# Patient Record
Sex: MALE | Race: BLACK OR AFRICAN AMERICAN | NOT HISPANIC OR LATINO | Employment: FULL TIME | RURAL
[De-identification: names, ages, dates, MRNs, and addresses within clinical notes are randomized per-mention and may not be internally consistent; named-entity substitution may affect disease eponyms.]

---

## 2022-01-10 ENCOUNTER — OFFICE VISIT (OUTPATIENT)
Dept: FAMILY MEDICINE | Facility: CLINIC | Age: 57
End: 2022-01-10
Payer: COMMERCIAL

## 2022-01-10 VITALS
TEMPERATURE: 101 F | BODY MASS INDEX: 32.73 KG/M2 | HEART RATE: 107 BPM | OXYGEN SATURATION: 99 % | HEIGHT: 70 IN | SYSTOLIC BLOOD PRESSURE: 138 MMHG | DIASTOLIC BLOOD PRESSURE: 85 MMHG | WEIGHT: 228.63 LBS

## 2022-01-10 DIAGNOSIS — U07.1 COVID: ICD-10-CM

## 2022-01-10 DIAGNOSIS — R05.9 COUGH: ICD-10-CM

## 2022-01-10 DIAGNOSIS — R68.83 CHILL: Primary | ICD-10-CM

## 2022-01-10 LAB
CTP QC/QA: YES
FLUAV AG NPH QL: NEGATIVE
FLUBV AG NPH QL: NEGATIVE
SARS-COV-2 AG RESP QL IA.RAPID: POSITIVE

## 2022-01-10 PROCEDURE — 87428 SARSCOV & INF VIR A&B AG IA: CPT | Mod: QW,,, | Performed by: FAMILY MEDICINE

## 2022-01-10 PROCEDURE — 87428 POCT SARS-COV2 (COVID) WITH FLU ANTIGEN: ICD-10-PCS | Mod: QW,,, | Performed by: FAMILY MEDICINE

## 2022-01-10 PROCEDURE — 99213 PR OFFICE/OUTPT VISIT, EST, LEVL III, 20-29 MIN: ICD-10-PCS | Mod: ,,, | Performed by: FAMILY MEDICINE

## 2022-01-10 PROCEDURE — 99213 OFFICE O/P EST LOW 20 MIN: CPT | Mod: ,,, | Performed by: FAMILY MEDICINE

## 2022-01-10 PROCEDURE — 3075F PR MOST RECENT SYSTOLIC BLOOD PRESS GE 130-139MM HG: ICD-10-PCS | Mod: CPTII,,, | Performed by: FAMILY MEDICINE

## 2022-01-10 PROCEDURE — 3079F PR MOST RECENT DIASTOLIC BLOOD PRESSURE 80-89 MM HG: ICD-10-PCS | Mod: CPTII,,, | Performed by: FAMILY MEDICINE

## 2022-01-10 PROCEDURE — 1159F PR MEDICATION LIST DOCUMENTED IN MEDICAL RECORD: ICD-10-PCS | Mod: CPTII,,, | Performed by: FAMILY MEDICINE

## 2022-01-10 PROCEDURE — 3075F SYST BP GE 130 - 139MM HG: CPT | Mod: CPTII,,, | Performed by: FAMILY MEDICINE

## 2022-01-10 PROCEDURE — 1159F MED LIST DOCD IN RCRD: CPT | Mod: CPTII,,, | Performed by: FAMILY MEDICINE

## 2022-01-10 PROCEDURE — 3008F PR BODY MASS INDEX (BMI) DOCUMENTED: ICD-10-PCS | Mod: CPTII,,, | Performed by: FAMILY MEDICINE

## 2022-01-10 PROCEDURE — 3008F BODY MASS INDEX DOCD: CPT | Mod: CPTII,,, | Performed by: FAMILY MEDICINE

## 2022-01-10 PROCEDURE — 3079F DIAST BP 80-89 MM HG: CPT | Mod: CPTII,,, | Performed by: FAMILY MEDICINE

## 2022-01-10 RX ORDER — ALBUTEROL SULFATE 90 UG/1
2 AEROSOL, METERED RESPIRATORY (INHALATION) EVERY 6 HOURS PRN
COMMUNITY
End: 2022-03-03 | Stop reason: SDUPTHER

## 2022-01-10 RX ORDER — CARVEDILOL 12.5 MG/1
1 TABLET ORAL 2 TIMES DAILY
COMMUNITY
Start: 2021-11-04 | End: 2022-02-04

## 2022-01-10 RX ORDER — CODEINE PHOSPHATE AND GUAIFENESIN 10; 100 MG/5ML; MG/5ML
5 SOLUTION ORAL 3 TIMES DAILY PRN
Qty: 240 ML | Refills: 0 | Status: SHIPPED | OUTPATIENT
Start: 2022-01-10 | End: 2022-01-26

## 2022-01-10 RX ORDER — SPIRONOLACTONE 25 MG/1
1 TABLET ORAL DAILY
COMMUNITY
Start: 2021-11-04 | End: 2022-02-04

## 2022-01-10 NOTE — LETTER
January 10, 2022      [facility]  [address]  [facility] [address]  Phone: [phone]  Fax: [phone]       Patient: Catalina Stephens   YOB: 1965  Date of Visit: 01/10/2022    To Whom It May Concern:    Donaldo Stephens  was at [facility] on 01/10/2022. He tested positive for Covid.  According to CDC guidelines, The patient may return to work on Saturday 01/15/22 with no restrictions. If you have any questions or concerns, or if I can be of further assistance, please do not hesitate to contact me.    Sincerely,    Ja Hernandez MD

## 2022-01-10 NOTE — PROGRESS NOTES
Ja Hernandez MD   [facility]  [address] [place] [address]     PATIENT NAME: Catalina Stephens  : 1965  DATE: 1/10/22  MRN: [id number]      Billing Provider: Ja Hernandez MD  Level of Service: [place] OFFICE/OUTPT VISIT, EST, LEVL III, 20-29 MIN  Patient PCP Information     Provider PCP Type    Ja Hernandez MD General          Reason for Visit / Chief Complaint: Cough (Dry cough that started yesterday. Feeling fatigued. Having chills. ) and Sinusitis (PN drainage, scratchy throat, and dry cough with chills off and on that started yesterday. )         History of Present Illness / Problem Focused Workflow     Catalina Stephens presents to the clinic with Cough (Dry cough that started yesterday. Feeling fatigued. Having chills. ) and Sinusitis (PN drainage, scratchy throat, and dry cough with chills off and on that started yesterday. )     HPI    Review of Systems     Review of Systems   Constitutional: Positive for chills and fever. Negative for activity change, appetite change and fatigue.   HENT: Positive for nasal congestion, rhinorrhea, sinus pressure/congestion and sore throat. Negative for ear pain and hearing loss.    Respiratory: Positive for cough. Negative for chest tightness and shortness of breath.    Cardiovascular: Negative for chest pain and palpitations.   Gastrointestinal: Negative for abdominal pain and fecal incontinence.   Genitourinary: Negative for bladder incontinence, difficulty urinating and erectile dysfunction.   Musculoskeletal: Negative for arthralgias.   Integumentary:  Negative for rash.   Neurological: Negative for dizziness and headaches.        Medical / Social / Family History     Past Medical History:   Diagnosis Date    Disorder of kidney and ureter     Hypertension        History reviewed. No pertinent surgical history.    Social History  Catalina Stephens  reports that he has never smoked. He has never used smokeless tobacco. He  reports previous alcohol use. He reports that he does not use drugs.    Family History  Catalina Stephens  family history includes Heart attack in his father; Kidney disease in his father; Stroke in his maternal grandfather.    Medications and Allergies     Medications  Outpatient Medications Marked as Taking for the 1/10/22 encounter (Office Visit) with Ja Hernandez MD   Medication Sig Dispense Refill    albuterol (PROAIR HFA) 90 mcg/actuation inhaler Inhale 2 puffs into the lungs every 6 (six) hours as needed for Wheezing. Rescue      carvediloL (COREG) 12.5 MG tablet Take 1 tablet by mouth 2 (two) times a day.      spironolactone (ALDACTONE) 25 MG tablet Take 1 tablet by mouth once daily.         Allergies  Review of patient's allergies indicates:   Allergen Reactions    Ace inhibitors Swelling    Iodine and iodide containing products Nausea And Vomiting     SHRIMP       Physical Examination     Vitals:    01/10/22 1558   BP: 138/85   Pulse: 107   Temp: (!) 101.4 °F (38.6 °C)     Physical Exam  Constitutional:       General: He is not in acute distress.     Appearance: He is not ill-appearing.   HENT:      Head: Normocephalic and atraumatic.      Right Ear: Tympanic membrane and ear canal normal.      Left Ear: Tympanic membrane and ear canal normal.      Nose: Congestion and rhinorrhea present.   Eyes:      Pupils: Pupils are equal, round, and reactive to light.   Cardiovascular:      Rate and Rhythm: Normal rate and regular rhythm.      Pulses: Normal pulses.      Heart sounds: No murmur heard.      Pulmonary:      Effort: No respiratory distress.      Breath sounds: No wheezing, rhonchi or rales.   Abdominal:      General: Bowel sounds are normal.      Palpations: Abdomen is soft.      Tenderness: There is no abdominal tenderness.      Hernia: No hernia is present.   Musculoskeletal:      Cervical back: Normal range of motion and neck supple.   Lymphadenopathy:      Cervical: No cervical adenopathy.    Skin:     General: Skin is warm and dry.   Neurological:      Mental Status: He is alert.   Psychiatric:         Behavior: Behavior normal.         Thought Content: Thought content normal.          Assessment and Plan (including Health Maintenance)   :    Plan:         Health Maintenance Due   Topic Date Due    Hepatitis C Screening  Never done    COVID-19 Vaccine (1) Never done    HIV Screening  Never done    TETANUS VACCINE  Never done    Colorectal Cancer Screening  Never done    Shingles Vaccine (1 of 2) Never done    Influenza Vaccine (1) Never done       Problem List Items Addressed This Visit    None     Visit Diagnoses     Chill    -  Primary    Relevant Orders    POCT SARS-COV2 (COVID) with Flu Antigen (Completed)    Cough        Relevant Orders    POCT SARS-COV2 (COVID) with Flu Antigen (Completed)    COVID            Chill  -     POCT SARS-COV2 (COVID) with Flu Antigen    Cough  -     POCT SARS-COV2 (COVID) with Flu Antigen    COVID    Other orders  -     guaiFENesin-codeine 100-10 mg/5 ml (TUSSI-ORGANIDIN NR)  mg/5 mL syrup; Take 5 mLs by mouth 3 (three) times daily as needed for Cough.  Dispense: 240 mL; Refill: 0       Health Maintenance Topics with due status: Not Due       Topic Last Completion Date    Lipid Panel 05/06/2021       Procedures     No future appointments.     No follow-ups on file.       Signature:  Ja Hernandez MD  [facility]  [address] [place] [address]  [phone] Phone  [phone] Fax    Date of encounter: 1/10/22

## 2022-03-03 ENCOUNTER — OFFICE VISIT (OUTPATIENT)
Dept: FAMILY MEDICINE | Facility: CLINIC | Age: 57
End: 2022-03-03
Payer: COMMERCIAL

## 2022-03-03 VITALS
DIASTOLIC BLOOD PRESSURE: 85 MMHG | TEMPERATURE: 98 F | BODY MASS INDEX: 32.78 KG/M2 | OXYGEN SATURATION: 98 % | HEIGHT: 70 IN | WEIGHT: 229 LBS | HEART RATE: 88 BPM | SYSTOLIC BLOOD PRESSURE: 122 MMHG

## 2022-03-03 DIAGNOSIS — I10 HYPERTENSION, UNSPECIFIED TYPE: Primary | ICD-10-CM

## 2022-03-03 DIAGNOSIS — J45.909 ASTHMA, UNSPECIFIED ASTHMA SEVERITY, UNSPECIFIED WHETHER COMPLICATED, UNSPECIFIED WHETHER PERSISTENT: ICD-10-CM

## 2022-03-03 LAB
ALBUMIN SERPL BCP-MCNC: 3.4 G/DL (ref 3.5–5)
ALBUMIN/GLOB SERPL: 0.9 {RATIO}
ALP SERPL-CCNC: 67 U/L (ref 45–115)
ALT SERPL W P-5'-P-CCNC: 12 U/L (ref 16–61)
ANION GAP SERPL CALCULATED.3IONS-SCNC: 13 MMOL/L (ref 7–16)
AST SERPL W P-5'-P-CCNC: 9 U/L (ref 15–37)
BASOPHILS # BLD AUTO: 0.02 K/UL (ref 0–0.2)
BASOPHILS NFR BLD AUTO: 0.4 % (ref 0–1)
BILIRUB SERPL-MCNC: 0.3 MG/DL (ref 0–1.2)
BUN SERPL-MCNC: 62 MG/DL (ref 7–18)
BUN/CREAT SERPL: 12 (ref 6–20)
CALCIUM SERPL-MCNC: 7.7 MG/DL (ref 8.5–10.1)
CHLORIDE SERPL-SCNC: 110 MMOL/L (ref 98–107)
CHOLEST SERPL-MCNC: 152 MG/DL (ref 0–200)
CHOLEST/HDLC SERPL: 4.5 {RATIO}
CO2 SERPL-SCNC: 20 MMOL/L (ref 21–32)
CREAT SERPL-MCNC: 5.32 MG/DL (ref 0.7–1.3)
DIFFERENTIAL METHOD BLD: ABNORMAL
EOSINOPHIL # BLD AUTO: 0.13 K/UL (ref 0–0.5)
EOSINOPHIL NFR BLD AUTO: 2.4 % (ref 1–4)
ERYTHROCYTE [DISTWIDTH] IN BLOOD BY AUTOMATED COUNT: 13.8 % (ref 11.5–14.5)
GLOBULIN SER-MCNC: 3.6 G/DL (ref 2–4)
GLUCOSE SERPL-MCNC: 88 MG/DL (ref 74–106)
HCT VFR BLD AUTO: 34.5 % (ref 40–54)
HDLC SERPL-MCNC: 34 MG/DL (ref 40–60)
HGB BLD-MCNC: 10.4 G/DL (ref 13.5–18)
IMM GRANULOCYTES # BLD AUTO: 0 K/UL (ref 0–0.04)
IMM GRANULOCYTES NFR BLD: 0 % (ref 0–0.4)
LDLC SERPL CALC-MCNC: 104 MG/DL
LDLC/HDLC SERPL: 3.1 {RATIO}
LYMPHOCYTES # BLD AUTO: 0.7 K/UL (ref 1–4.8)
LYMPHOCYTES NFR BLD AUTO: 13.1 % (ref 27–41)
MCH RBC QN AUTO: 28.3 PG (ref 27–31)
MCHC RBC AUTO-ENTMCNC: 30.1 G/DL (ref 32–36)
MCV RBC AUTO: 93.8 FL (ref 80–96)
MONOCYTES # BLD AUTO: 0.46 K/UL (ref 0–0.8)
MONOCYTES NFR BLD AUTO: 8.6 % (ref 2–6)
MPC BLD CALC-MCNC: 12.1 FL (ref 9.4–12.4)
NEUTROPHILS # BLD AUTO: 4.03 K/UL (ref 1.8–7.7)
NEUTROPHILS NFR BLD AUTO: 75.5 % (ref 53–65)
NONHDLC SERPL-MCNC: 118 MG/DL
NRBC # BLD AUTO: 0 X10E3/UL
NRBC, AUTO (.00): 0 %
PLATELET # BLD AUTO: 214 K/UL (ref 150–400)
POTASSIUM SERPL-SCNC: 5.5 MMOL/L (ref 3.5–5.1)
PROT SERPL-MCNC: 7 G/DL (ref 6.4–8.2)
RBC # BLD AUTO: 3.68 M/UL (ref 4.6–6.2)
SODIUM SERPL-SCNC: 137 MMOL/L (ref 136–145)
TRIGL SERPL-MCNC: 71 MG/DL (ref 35–150)
VLDLC SERPL-MCNC: 14 MG/DL
WBC # BLD AUTO: 5.34 K/UL (ref 4.5–11)

## 2022-03-03 PROCEDURE — 80061 LIPID PANEL: CPT | Mod: ,,, | Performed by: CLINICAL MEDICAL LABORATORY

## 2022-03-03 PROCEDURE — 80053 COMPREHEN METABOLIC PANEL: CPT | Mod: ,,, | Performed by: CLINICAL MEDICAL LABORATORY

## 2022-03-03 PROCEDURE — 3074F SYST BP LT 130 MM HG: CPT | Mod: CPTII,,, | Performed by: FAMILY MEDICINE

## 2022-03-03 PROCEDURE — 3074F PR MOST RECENT SYSTOLIC BLOOD PRESSURE < 130 MM HG: ICD-10-PCS | Mod: CPTII,,, | Performed by: FAMILY MEDICINE

## 2022-03-03 PROCEDURE — 80053 COMPREHENSIVE METABOLIC PANEL: ICD-10-PCS | Mod: ,,, | Performed by: CLINICAL MEDICAL LABORATORY

## 2022-03-03 PROCEDURE — 1159F MED LIST DOCD IN RCRD: CPT | Mod: CPTII,,, | Performed by: FAMILY MEDICINE

## 2022-03-03 PROCEDURE — 85025 COMPLETE CBC W/AUTO DIFF WBC: CPT | Mod: ,,, | Performed by: CLINICAL MEDICAL LABORATORY

## 2022-03-03 PROCEDURE — 3079F PR MOST RECENT DIASTOLIC BLOOD PRESSURE 80-89 MM HG: ICD-10-PCS | Mod: CPTII,,, | Performed by: FAMILY MEDICINE

## 2022-03-03 PROCEDURE — 99214 PR OFFICE/OUTPT VISIT, EST, LEVL IV, 30-39 MIN: ICD-10-PCS | Mod: ,,, | Performed by: FAMILY MEDICINE

## 2022-03-03 PROCEDURE — 3008F PR BODY MASS INDEX (BMI) DOCUMENTED: ICD-10-PCS | Mod: CPTII,,, | Performed by: FAMILY MEDICINE

## 2022-03-03 PROCEDURE — 3008F BODY MASS INDEX DOCD: CPT | Mod: CPTII,,, | Performed by: FAMILY MEDICINE

## 2022-03-03 PROCEDURE — 99214 OFFICE O/P EST MOD 30 MIN: CPT | Mod: ,,, | Performed by: FAMILY MEDICINE

## 2022-03-03 PROCEDURE — 85025 CBC WITH DIFFERENTIAL: ICD-10-PCS | Mod: ,,, | Performed by: CLINICAL MEDICAL LABORATORY

## 2022-03-03 PROCEDURE — 80061 LIPID PANEL: ICD-10-PCS | Mod: ,,, | Performed by: CLINICAL MEDICAL LABORATORY

## 2022-03-03 PROCEDURE — 3079F DIAST BP 80-89 MM HG: CPT | Mod: CPTII,,, | Performed by: FAMILY MEDICINE

## 2022-03-03 PROCEDURE — 1159F PR MEDICATION LIST DOCUMENTED IN MEDICAL RECORD: ICD-10-PCS | Mod: CPTII,,, | Performed by: FAMILY MEDICINE

## 2022-03-03 RX ORDER — SPIRONOLACTONE 25 MG/1
25 TABLET ORAL DAILY
Qty: 90 TABLET | Refills: 1 | Status: ON HOLD | OUTPATIENT
Start: 2022-03-03 | End: 2022-05-02 | Stop reason: HOSPADM

## 2022-03-03 RX ORDER — ALBUTEROL SULFATE 90 UG/1
2 AEROSOL, METERED RESPIRATORY (INHALATION) EVERY 6 HOURS PRN
Qty: 18 G | Refills: 2 | Status: SHIPPED | OUTPATIENT
Start: 2022-03-03 | End: 2023-08-17

## 2022-03-03 RX ORDER — MONTELUKAST SODIUM 10 MG/1
10 TABLET ORAL NIGHTLY
Qty: 90 TABLET | Refills: 3 | Status: SHIPPED | OUTPATIENT
Start: 2022-03-03 | End: 2022-04-02

## 2022-03-03 RX ORDER — CARVEDILOL 12.5 MG/1
12.5 TABLET ORAL 2 TIMES DAILY
Qty: 180 TABLET | Refills: 1 | Status: SHIPPED | OUTPATIENT
Start: 2022-03-03 | End: 2022-07-06

## 2022-03-03 NOTE — PROGRESS NOTES
Ja Hernandez MD   [facility]  [address] [place] [address]     PATIENT NAME: Catalina Stephens  : 1965  DATE: 3/3/22  MRN: [id number]      Billing Provider: Ja Hernandez MD  Level of Service: IN OFFICE/OUTPT VISIT, EST, LEVL IV, 30-39 MIN  Patient PCP Information     Provider PCP Type    Ja Hernandez MD General          Reason for Visit / Chief Complaint: Hypertension (Check up; Labs; Refills. ) and Medication Refill (Patient reports asthma has been increased in the past week. Increased shortness of breath at times. )         History of Present Illness / Problem Focused Workflow     Catalina Stephens presents to the clinic with Hypertension (Check up; Labs; Refills. ) and Medication Refill (Patient reports asthma has been increased in the past week. Increased shortness of breath at times. )     HPI    Review of Systems     Review of Systems   Constitutional: Negative for activity change, appetite change, fatigue and fever.   HENT: Negative for nasal congestion, ear pain, hearing loss, sinus pressure/congestion and sore throat.    Respiratory: Negative for cough, chest tightness and shortness of breath.    Cardiovascular: Negative for chest pain and palpitations.   Gastrointestinal: Negative for abdominal pain and fecal incontinence.   Genitourinary: Negative for bladder incontinence, difficulty urinating and erectile dysfunction.   Musculoskeletal: Negative for arthralgias.   Integumentary:  Negative for rash.   Neurological: Negative for dizziness and headaches.        Medical / Social / Family History     Past Medical History:   Diagnosis Date    Disorder of kidney and ureter     Hypertension     Mild intermittent asthma, uncomplicated        No past surgical history on file.    Social History  Catalina Stephens  reports that he has never smoked. He has never used smokeless tobacco. He reports previous alcohol use. He reports that he does not use  drugs.    Family History  Catalina Stephens  family history includes Heart attack in his father; Kidney disease in his father; Stroke in his maternal grandfather.    Medications and Allergies     Medications  Outpatient Medications Marked as Taking for the 3/3/22 encounter (Office Visit) with Ja Hernandez MD   Medication Sig Dispense Refill    [DISCONTINUED] albuterol (PROVENTIL/VENTOLIN HFA) 90 mcg/actuation inhaler Inhale 2 puffs into the lungs every 6 (six) hours as needed for Wheezing. Rescue      [DISCONTINUED] carvediloL (COREG) 12.5 MG tablet TAKE 1 TABLET BY MOUTH TWICE A DAY 60 tablet 0    [DISCONTINUED] spironolactone (ALDACTONE) 25 MG tablet TAKE 1 TABLET BY MOUTH EVERY DAY 30 tablet 0       Allergies  Review of patient's allergies indicates:   Allergen Reactions    Ace inhibitors Swelling    Iodine and iodide containing products Nausea And Vomiting     SHRIMP       Physical Examination     Vitals:    03/03/22 0736   BP: 122/85   Pulse: 88   Temp: 98 °F (36.7 °C)     Physical Exam  Constitutional:       General: He is not in acute distress.     Appearance: He is not ill-appearing.   HENT:      Head: Normocephalic and atraumatic.      Right Ear: Tympanic membrane and ear canal normal.      Left Ear: Tympanic membrane and ear canal normal.      Nose: Nose normal. No congestion or rhinorrhea.   Eyes:      Pupils: Pupils are equal, round, and reactive to light.   Cardiovascular:      Rate and Rhythm: Normal rate and regular rhythm.      Pulses: Normal pulses.      Heart sounds: No murmur heard.  Pulmonary:      Effort: No respiratory distress.      Breath sounds: No wheezing, rhonchi or rales.   Abdominal:      General: Bowel sounds are normal.      Palpations: Abdomen is soft.      Tenderness: There is no abdominal tenderness.      Hernia: No hernia is present.   Musculoskeletal:      Cervical back: Normal range of motion and neck supple.   Lymphadenopathy:      Cervical: No cervical adenopathy.    Skin:     General: Skin is warm and dry.   Neurological:      Mental Status: He is alert.   Psychiatric:         Behavior: Behavior normal.         Thought Content: Thought content normal.          Assessment and Plan (including Health Maintenance)   :    Plan:         Health Maintenance Due   Topic Date Due    Hepatitis C Screening  Never done    COVID-19 Vaccine (1) Never done    HIV Screening  Never done    TETANUS VACCINE  Never done    Colorectal Cancer Screening  Never done    Shingles Vaccine (1 of 2) Never done    Influenza Vaccine (1) Never done       Problem List Items Addressed This Visit    None     Visit Diagnoses     Hypertension, unspecified type    -  Primary    Relevant Orders    CBC Auto Differential    Comprehensive Metabolic Panel    Lipid Panel    Asthma, unspecified asthma severity, unspecified whether complicated, unspecified whether persistent            Hypertension, unspecified type  -     CBC Auto Differential; Future; Expected date: 03/03/2022  -     Comprehensive Metabolic Panel; Future; Expected date: 03/03/2022  -     Lipid Panel; Future; Expected date: 03/03/2022    Asthma, unspecified asthma severity, unspecified whether complicated, unspecified whether persistent    Other orders  -     spironolactone (ALDACTONE) 25 MG tablet; Take 1 tablet (25 mg total) by mouth once daily.  Dispense: 90 tablet; Refill: 1  -     carvediloL (COREG) 12.5 MG tablet; Take 1 tablet (12.5 mg total) by mouth 2 (two) times daily.  Dispense: 180 tablet; Refill: 1  -     albuterol (PROVENTIL/VENTOLIN HFA) 90 mcg/actuation inhaler; Inhale 2 puffs into the lungs every 6 (six) hours as needed for Wheezing. Rescue  Dispense: 18 g; Refill: 2  -     montelukast (SINGULAIR) 10 mg tablet; Take 1 tablet (10 mg total) by mouth every evening.  Dispense: 90 tablet; Refill: 3       Health Maintenance Topics with due status: Not Due       Topic Last Completion Date    Lipid Panel 05/06/2021       Procedures     No  "future appointments.     No follow-ups on file.  Discussed importance of colonoscopy. He sasys he will "get back" with us about it.    Signature:  Ja Hernandez MD  [facility]  [address] [place] [address]  [phone] Phone  [phone] Fax    Date of encounter: 3/3/22  "

## 2022-04-23 ENCOUNTER — HOSPITAL ENCOUNTER (EMERGENCY)
Facility: HOSPITAL | Age: 57
Discharge: ANOTHER HOSPITAL | End: 2022-04-23
Payer: COMMERCIAL

## 2022-04-23 VITALS
OXYGEN SATURATION: 99 % | WEIGHT: 235.5 LBS | HEIGHT: 68 IN | HEART RATE: 75 BPM | DIASTOLIC BLOOD PRESSURE: 74 MMHG | BODY MASS INDEX: 35.69 KG/M2 | SYSTOLIC BLOOD PRESSURE: 127 MMHG | TEMPERATURE: 98 F | RESPIRATION RATE: 18 BRPM

## 2022-04-23 DIAGNOSIS — E87.5 HYPERKALEMIA: ICD-10-CM

## 2022-04-23 DIAGNOSIS — R05.9 COUGH: ICD-10-CM

## 2022-04-23 DIAGNOSIS — N28.9 ACUTE RENAL INSUFFICIENCY: ICD-10-CM

## 2022-04-23 DIAGNOSIS — M79.603 ARM PAIN: ICD-10-CM

## 2022-04-23 DIAGNOSIS — R20.2 PARESTHESIA: Primary | ICD-10-CM

## 2022-04-23 LAB
ALBUMIN SERPL BCP-MCNC: 3.7 G/DL (ref 3.5–5)
ALBUMIN/GLOB SERPL: 1 {RATIO}
ALP SERPL-CCNC: 73 U/L (ref 45–115)
ALT SERPL W P-5'-P-CCNC: 26 U/L (ref 16–61)
AMPHET UR QL SCN: NEGATIVE
ANION GAP SERPL CALCULATED.3IONS-SCNC: 13 MMOL/L (ref 7–16)
ANION GAP SERPL CALCULATED.3IONS-SCNC: 14 MMOL/L (ref 7–16)
ANION GAP SERPL CALCULATED.3IONS-SCNC: 16 MMOL/L (ref 7–16)
AST SERPL W P-5'-P-CCNC: 19 U/L (ref 15–37)
BACTERIA #/AREA URNS HPF: ABNORMAL /HPF
BARBITURATES UR QL SCN: NEGATIVE
BASOPHILS # BLD AUTO: 0.01 K/UL (ref 0–0.2)
BASOPHILS NFR BLD AUTO: 0.2 % (ref 0–1)
BENZODIAZ METAB UR QL SCN: NEGATIVE
BILIRUB SERPL-MCNC: 0.3 MG/DL (ref 0–1.2)
BILIRUB UR QL STRIP: NEGATIVE
BUN SERPL-MCNC: 72 MG/DL (ref 7–18)
BUN SERPL-MCNC: 72 MG/DL (ref 7–18)
BUN SERPL-MCNC: 73 MG/DL (ref 7–18)
BUN/CREAT SERPL: 11 (ref 6–20)
BUN/CREAT SERPL: 11 (ref 6–20)
BUN/CREAT SERPL: 12 (ref 6–20)
CALCIUM SERPL-MCNC: 7 MG/DL (ref 8.5–10.1)
CALCIUM SERPL-MCNC: 7.3 MG/DL (ref 8.5–10.1)
CALCIUM SERPL-MCNC: 7.7 MG/DL (ref 8.5–10.1)
CANNABINOIDS UR QL SCN: NEGATIVE
CHLORIDE SERPL-SCNC: 108 MMOL/L (ref 98–107)
CHLORIDE SERPL-SCNC: 109 MMOL/L (ref 98–107)
CHLORIDE SERPL-SCNC: 109 MMOL/L (ref 98–107)
CLARITY UR: CLEAR
CO2 SERPL-SCNC: 19 MMOL/L (ref 21–32)
CO2 SERPL-SCNC: 21 MMOL/L (ref 21–32)
CO2 SERPL-SCNC: 22 MMOL/L (ref 21–32)
COCAINE UR QL SCN: NEGATIVE
COLOR UR: YELLOW
CREAT SERPL-MCNC: 6.26 MG/DL (ref 0.7–1.3)
CREAT SERPL-MCNC: 6.48 MG/DL (ref 0.7–1.3)
CREAT SERPL-MCNC: 6.51 MG/DL (ref 0.7–1.3)
DIFFERENTIAL METHOD BLD: ABNORMAL
EOSINOPHIL # BLD AUTO: 0.09 K/UL (ref 0–0.5)
EOSINOPHIL NFR BLD AUTO: 1.9 % (ref 1–4)
ERYTHROCYTE [DISTWIDTH] IN BLOOD BY AUTOMATED COUNT: 14.9 % (ref 11.5–14.5)
FLUAV AG UPPER RESP QL IA.RAPID: NEGATIVE
FLUBV AG UPPER RESP QL IA.RAPID: NEGATIVE
GLOBULIN SER-MCNC: 3.8 G/DL (ref 2–4)
GLUCOSE SERPL-MCNC: 101 MG/DL (ref 70–105)
GLUCOSE SERPL-MCNC: 107 MG/DL (ref 74–106)
GLUCOSE SERPL-MCNC: 124 MG/DL (ref 70–105)
GLUCOSE SERPL-MCNC: 138 MG/DL (ref 70–105)
GLUCOSE SERPL-MCNC: 36 MG/DL (ref 74–106)
GLUCOSE SERPL-MCNC: 37 MG/DL (ref 70–105)
GLUCOSE SERPL-MCNC: 37 MG/DL (ref 70–105)
GLUCOSE SERPL-MCNC: 82 MG/DL (ref 70–105)
GLUCOSE SERPL-MCNC: 85 MG/DL (ref 74–106)
GLUCOSE UR STRIP-MCNC: NEGATIVE MG/DL
HCT VFR BLD AUTO: 37.7 % (ref 40–54)
HGB BLD-MCNC: 11.6 G/DL (ref 13.5–18)
IMM GRANULOCYTES # BLD AUTO: 0.01 K/UL (ref 0–0.04)
IMM GRANULOCYTES NFR BLD: 0.2 % (ref 0–0.4)
KETONES UR STRIP-SCNC: NEGATIVE MG/DL
LEUKOCYTE ESTERASE UR QL STRIP: NEGATIVE
LYMPHOCYTES # BLD AUTO: 0.62 K/UL (ref 1–4.8)
LYMPHOCYTES NFR BLD AUTO: 13.3 % (ref 27–41)
MAGNESIUM SERPL-MCNC: 1.2 MG/DL (ref 1.7–2.3)
MCH RBC QN AUTO: 28.2 PG (ref 27–31)
MCHC RBC AUTO-ENTMCNC: 30.8 G/DL (ref 32–36)
MCV RBC AUTO: 91.7 FL (ref 80–96)
MONOCYTES # BLD AUTO: 0.27 K/UL (ref 0–0.8)
MONOCYTES NFR BLD AUTO: 5.8 % (ref 2–6)
MPC BLD CALC-MCNC: 10.4 FL (ref 9.4–12.4)
NEUTROPHILS # BLD AUTO: 3.67 K/UL (ref 1.8–7.7)
NEUTROPHILS NFR BLD AUTO: 78.6 % (ref 53–65)
NITRITE UR QL STRIP: NEGATIVE
OPIATES UR QL SCN: NEGATIVE
PCP UR QL SCN: NEGATIVE
PH UR STRIP: 5.5 PH UNITS
PLATELET # BLD AUTO: 208 K/UL (ref 150–400)
POTASSIUM SERPL-SCNC: 5.3 MMOL/L (ref 3.5–5.1)
POTASSIUM SERPL-SCNC: 6.1 MMOL/L (ref 3.5–5.1)
POTASSIUM SERPL-SCNC: 7 MMOL/L (ref 3.5–5.1)
PROT SERPL-MCNC: 7.5 G/DL (ref 6.4–8.2)
PROT UR QL STRIP: 100
RBC # BLD AUTO: 4.11 M/UL (ref 4.6–6.2)
RBC # UR STRIP: ABNORMAL /UL
RBC #/AREA URNS HPF: ABNORMAL /HPF
SARS-COV+SARS-COV-2 AG RESP QL IA.RAPID: NEGATIVE
SODIUM SERPL-SCNC: 137 MMOL/L (ref 136–145)
SODIUM SERPL-SCNC: 137 MMOL/L (ref 136–145)
SODIUM SERPL-SCNC: 139 MMOL/L (ref 136–145)
SP GR UR STRIP: 1.01
SQUAMOUS #/AREA URNS LPF: ABNORMAL /LPF
TROPONIN I SERPL HS-MCNC: 35.2 PG/ML
UROBILINOGEN UR STRIP-ACNC: 0.2 MG/DL
WBC # BLD AUTO: 4.67 K/UL (ref 4.5–11)
WBC #/AREA URNS HPF: ABNORMAL /HPF

## 2022-04-23 PROCEDURE — 82962 GLUCOSE BLOOD TEST: CPT

## 2022-04-23 PROCEDURE — 84484 ASSAY OF TROPONIN QUANT: CPT | Performed by: FAMILY MEDICINE

## 2022-04-23 PROCEDURE — 80053 COMPREHEN METABOLIC PANEL: CPT | Performed by: FAMILY MEDICINE

## 2022-04-23 PROCEDURE — 93010 ELECTROCARDIOGRAM REPORT: CPT | Mod: ,,, | Performed by: INTERNAL MEDICINE

## 2022-04-23 PROCEDURE — 99283 PR EMERGENCY DEPT VISIT,LEVEL III: ICD-10-PCS | Mod: ,,, | Performed by: FAMILY MEDICINE

## 2022-04-23 PROCEDURE — 87428 SARSCOV & INF VIR A&B AG IA: CPT | Performed by: FAMILY MEDICINE

## 2022-04-23 PROCEDURE — 36415 COLL VENOUS BLD VENIPUNCTURE: CPT | Performed by: FAMILY MEDICINE

## 2022-04-23 PROCEDURE — 80048 BASIC METABOLIC PNL TOTAL CA: CPT | Mod: XB | Performed by: FAMILY MEDICINE

## 2022-04-23 PROCEDURE — 85025 COMPLETE CBC W/AUTO DIFF WBC: CPT | Performed by: FAMILY MEDICINE

## 2022-04-23 PROCEDURE — 25000003 PHARM REV CODE 250: Performed by: FAMILY MEDICINE

## 2022-04-23 PROCEDURE — 99283 EMERGENCY DEPT VISIT LOW MDM: CPT | Mod: ,,, | Performed by: FAMILY MEDICINE

## 2022-04-23 PROCEDURE — 93005 ELECTROCARDIOGRAM TRACING: CPT

## 2022-04-23 PROCEDURE — 96375 TX/PRO/DX INJ NEW DRUG ADDON: CPT

## 2022-04-23 PROCEDURE — 81001 URINALYSIS AUTO W/SCOPE: CPT | Performed by: FAMILY MEDICINE

## 2022-04-23 PROCEDURE — 25000003 PHARM REV CODE 250

## 2022-04-23 PROCEDURE — 93010 EKG 12-LEAD: ICD-10-PCS | Mod: ,,, | Performed by: INTERNAL MEDICINE

## 2022-04-23 PROCEDURE — 99285 EMERGENCY DEPT VISIT HI MDM: CPT | Mod: 25

## 2022-04-23 PROCEDURE — 83735 ASSAY OF MAGNESIUM: CPT | Performed by: FAMILY MEDICINE

## 2022-04-23 PROCEDURE — 80307 DRUG TEST PRSMV CHEM ANLYZR: CPT | Performed by: FAMILY MEDICINE

## 2022-04-23 PROCEDURE — 96376 TX/PRO/DX INJ SAME DRUG ADON: CPT

## 2022-04-23 PROCEDURE — 63600175 PHARM REV CODE 636 W HCPCS: Performed by: FAMILY MEDICINE

## 2022-04-23 PROCEDURE — 96361 HYDRATE IV INFUSION ADD-ON: CPT

## 2022-04-23 PROCEDURE — 96365 THER/PROPH/DIAG IV INF INIT: CPT

## 2022-04-23 RX ORDER — DEXTROSE MONOHYDRATE AND SODIUM CHLORIDE 5; .9 G/100ML; G/100ML
1000 INJECTION, SOLUTION INTRAVENOUS
Status: COMPLETED | OUTPATIENT
Start: 2022-04-23 | End: 2022-04-23

## 2022-04-23 RX ORDER — CALCIUM GLUCONATE 98 MG/ML
1 INJECTION, SOLUTION INTRAVENOUS ONCE
Status: DISCONTINUED | OUTPATIENT
Start: 2022-04-23 | End: 2022-04-23

## 2022-04-23 RX ORDER — SODIUM CHLORIDE 9 MG/ML
1000 INJECTION, SOLUTION INTRAVENOUS
Status: DISCONTINUED | OUTPATIENT
Start: 2022-04-23 | End: 2022-04-23

## 2022-04-23 RX ORDER — CALCIUM GLUCONATE 20 MG/ML
1 INJECTION, SOLUTION INTRAVENOUS ONCE
Status: COMPLETED | OUTPATIENT
Start: 2022-04-23 | End: 2022-04-23

## 2022-04-23 RX ORDER — SODIUM CHLORIDE 9 MG/ML
1000 INJECTION, SOLUTION INTRAVENOUS
Status: COMPLETED | OUTPATIENT
Start: 2022-04-23 | End: 2022-04-23

## 2022-04-23 RX ORDER — DEXTROSE MONOHYDRATE 100 MG/ML
INJECTION, SOLUTION INTRAVENOUS CONTINUOUS
Status: DISCONTINUED | OUTPATIENT
Start: 2022-04-23 | End: 2022-04-24 | Stop reason: HOSPADM

## 2022-04-23 RX ADMIN — HUMAN INSULIN 10 UNITS: 100 INJECTION, SOLUTION SUBCUTANEOUS at 08:04

## 2022-04-23 RX ADMIN — CALCIUM GLUCONATE 1 G: 20 INJECTION, SOLUTION INTRAVENOUS at 08:04

## 2022-04-23 RX ADMIN — SODIUM CHLORIDE 1000 ML: 9 INJECTION, SOLUTION INTRAVENOUS at 08:04

## 2022-04-23 RX ADMIN — DEXTROSE MONOHYDRATE 25 G: 25 INJECTION, SOLUTION INTRAVENOUS at 08:04

## 2022-04-23 RX ADMIN — DEXTROSE AND SODIUM CHLORIDE 1000 ML: 5; .9 INJECTION, SOLUTION INTRAVENOUS at 09:04

## 2022-04-23 RX ADMIN — DEXTROSE: 10 SOLUTION INTRAVENOUS at 10:04

## 2022-04-23 RX ADMIN — DEXTROSE MONOHYDRATE 25 G: 25 INJECTION, SOLUTION INTRAVENOUS at 09:04

## 2022-04-23 RX ADMIN — DEXTROSE MONOHYDRATE 25 G: 25 INJECTION, SOLUTION INTRAVENOUS at 10:04

## 2022-04-23 NOTE — ED TRIAGE NOTES
PATIENT STATED HE HAS SOME TINGLING IN HIS RIGHT ARM WHILE SITTING IN CHAIR APPROXIMATELY 1 HOUR BEFORE COMING TO ER.  SPOUSE WANTED HIM TO COME TO ER JUST TO BE CHECKED OUT    NO C/O PAIN OR DISCOMFORT

## 2022-04-24 ENCOUNTER — HOSPITAL ENCOUNTER (INPATIENT)
Facility: HOSPITAL | Age: 57
LOS: 8 days | Discharge: HOME | DRG: 674 | End: 2022-05-02
Attending: HOSPITALIST | Admitting: HOSPITALIST
Payer: COMMERCIAL

## 2022-04-24 DIAGNOSIS — G45.9 TIA (TRANSIENT ISCHEMIC ATTACK): ICD-10-CM

## 2022-04-24 DIAGNOSIS — N18.9 ACUTE KIDNEY INJURY SUPERIMPOSED ON CHRONIC KIDNEY DISEASE: ICD-10-CM

## 2022-04-24 DIAGNOSIS — I51.7 CARDIOMEGALY: ICD-10-CM

## 2022-04-24 DIAGNOSIS — N17.9 ACUTE KIDNEY INJURY SUPERIMPOSED ON CHRONIC KIDNEY DISEASE: ICD-10-CM

## 2022-04-24 DIAGNOSIS — N28.9 RENAL INSUFFICIENCY: ICD-10-CM

## 2022-04-24 DIAGNOSIS — I82.619: ICD-10-CM

## 2022-04-24 DIAGNOSIS — E87.5 HYPERKALEMIA: Primary | ICD-10-CM

## 2022-04-24 LAB
ALBUMIN SERPL BCP-MCNC: 3.4 G/DL (ref 3.5–5)
ALBUMIN/GLOB SERPL: 1.1 {RATIO}
ALP SERPL-CCNC: 60 U/L (ref 45–115)
ALT SERPL W P-5'-P-CCNC: 28 U/L (ref 16–61)
ANION GAP SERPL CALCULATED.3IONS-SCNC: 20 MMOL/L (ref 7–16)
ANION GAP SERPL CALCULATED.3IONS-SCNC: 21 MMOL/L (ref 7–16)
APTT PPP: 29.3 SECONDS (ref 25.2–37.3)
AST SERPL W P-5'-P-CCNC: 16 U/L (ref 15–37)
AV INDEX (PROSTH): 0.66
AV VALVE AREA: 2.3 CM2
BILIRUB SERPL-MCNC: 0.4 MG/DL (ref 0–1.2)
BSA FOR ECHO PROCEDURE: 2.29 M2
BUN SERPL-MCNC: 72 MG/DL (ref 7–18)
BUN SERPL-MCNC: 75 MG/DL (ref 7–18)
BUN/CREAT SERPL: 12 (ref 6–20)
BUN/CREAT SERPL: 13 (ref 6–20)
CALCIUM SERPL-MCNC: 7.2 MG/DL (ref 8.5–10.1)
CALCIUM SERPL-MCNC: 7.2 MG/DL (ref 8.5–10.1)
CHLORIDE SERPL-SCNC: 105 MMOL/L (ref 98–107)
CHLORIDE SERPL-SCNC: 106 MMOL/L (ref 98–107)
CHOLEST SERPL-MCNC: 140 MG/DL (ref 0–200)
CHOLEST/HDLC SERPL: 3.7 {RATIO}
CO2 SERPL-SCNC: 18 MMOL/L (ref 21–32)
CO2 SERPL-SCNC: 18 MMOL/L (ref 21–32)
CREAT SERPL-MCNC: 5.79 MG/DL (ref 0.7–1.3)
CREAT SERPL-MCNC: 5.97 MG/DL (ref 0.7–1.3)
CRP SERPL-MCNC: <0.2 MG/DL (ref 0–0.8)
CV ECHO LV RWT: 0.39 CM
DOP CALC AO VTI: 17.36 CM
DOP CALC LVOT AREA: 3.5 CM2
DOP CALC LVOT DIAMETER: 2.1 CM
DOP CALC LVOT STROKE VOLUME: 39.95 CM3
DOP CALCLVOT PEAK VEL VTI: 11.54 CM
ECHO LV POSTERIOR WALL: 1.1 CM (ref 0.6–1.1)
EJECTION FRACTION: 25 %
ERYTHROCYTE [SEDIMENTATION RATE] IN BLOOD BY WESTERGREN METHOD: 6 MM/HR (ref 0–20)
FRACTIONAL SHORTENING: 9 % (ref 28–44)
GLOBULIN SER-MCNC: 3.1 G/DL (ref 2–4)
GLUCOSE SERPL-MCNC: 138 MG/DL (ref 70–105)
GLUCOSE SERPL-MCNC: 85 MG/DL (ref 74–106)
GLUCOSE SERPL-MCNC: 86 MG/DL (ref 74–106)
HDLC SERPL-MCNC: 38 MG/DL (ref 40–60)
INR BLD: 1.06 (ref 0.9–1.1)
INTERVENTRICULAR SEPTUM: 1.4 CM (ref 0.6–1.1)
IVC DIAMETER: 1.8 CM
LDLC SERPL CALC-MCNC: 92 MG/DL
LDLC/HDLC SERPL: 2.4 {RATIO}
LEFT ATRIUM SIZE: 5.6 CM
LEFT ATRIUM VOLUME INDEX MOD: 41.4 ML/M2
LEFT ATRIUM VOLUME MOD: 92 CM3
LEFT INTERNAL DIMENSION IN SYSTOLE: 5.1 CM (ref 2.1–4)
LEFT VENTRICLE MASS INDEX: 134 G/M2
LEFT VENTRICULAR INTERNAL DIMENSION IN DIASTOLE: 5.6 CM (ref 3.5–6)
LEFT VENTRICULAR MASS: 296.65 G
MAGNESIUM SERPL-MCNC: 1.8 MG/DL (ref 1.7–2.3)
NONHDLC SERPL-MCNC: 102 MG/DL
PHOSPHATE SERPL-MCNC: 5.7 MG/DL (ref 2.5–4.5)
POTASSIUM SERPL-SCNC: 5.6 MMOL/L (ref 3.5–5.1)
POTASSIUM SERPL-SCNC: 6.1 MMOL/L (ref 3.5–5.1)
PROT SERPL-MCNC: 6.5 G/DL (ref 6.4–8.2)
PROTHROMBIN TIME: 13.8 SECONDS (ref 11.7–14.7)
RA PRESSURE: 3 MMHG
SODIUM SERPL-SCNC: 137 MMOL/L (ref 136–145)
SODIUM SERPL-SCNC: 139 MMOL/L (ref 136–145)
TRIGL SERPL-MCNC: 48 MG/DL (ref 35–150)
TROPONIN I SERPL HS-MCNC: 31.2 PG/ML
TSH SERPL DL<=0.005 MIU/L-ACNC: 1.13 UIU/ML (ref 0.36–3.74)
VIT B12 SERPL-MCNC: 402 PG/ML (ref 193–986)
VLDLC SERPL-MCNC: 10 MG/DL

## 2022-04-24 PROCEDURE — 63600175 PHARM REV CODE 636 W HCPCS: Performed by: HOSPITALIST

## 2022-04-24 PROCEDURE — 25000003 PHARM REV CODE 250: Performed by: HOSPITALIST

## 2022-04-24 PROCEDURE — 85651 RBC SED RATE NONAUTOMATED: CPT | Performed by: HOSPITALIST

## 2022-04-24 PROCEDURE — 86140 C-REACTIVE PROTEIN: CPT | Performed by: HOSPITALIST

## 2022-04-24 PROCEDURE — 82607 VITAMIN B-12: CPT | Performed by: HOSPITALIST

## 2022-04-24 PROCEDURE — 36415 COLL VENOUS BLD VENIPUNCTURE: CPT | Performed by: STUDENT IN AN ORGANIZED HEALTH CARE EDUCATION/TRAINING PROGRAM

## 2022-04-24 PROCEDURE — 80053 COMPREHEN METABOLIC PANEL: CPT | Performed by: HOSPITALIST

## 2022-04-24 PROCEDURE — 84100 ASSAY OF PHOSPHORUS: CPT | Performed by: HOSPITALIST

## 2022-04-24 PROCEDURE — 11000001 HC ACUTE MED/SURG PRIVATE ROOM

## 2022-04-24 PROCEDURE — 82962 GLUCOSE BLOOD TEST: CPT

## 2022-04-24 PROCEDURE — 85610 PROTHROMBIN TIME: CPT | Performed by: HOSPITALIST

## 2022-04-24 PROCEDURE — 80048 BASIC METABOLIC PNL TOTAL CA: CPT | Mod: XB | Performed by: STUDENT IN AN ORGANIZED HEALTH CARE EDUCATION/TRAINING PROGRAM

## 2022-04-24 PROCEDURE — 25000003 PHARM REV CODE 250: Performed by: STUDENT IN AN ORGANIZED HEALTH CARE EDUCATION/TRAINING PROGRAM

## 2022-04-24 PROCEDURE — 83735 ASSAY OF MAGNESIUM: CPT | Performed by: HOSPITALIST

## 2022-04-24 PROCEDURE — 84484 ASSAY OF TROPONIN QUANT: CPT | Performed by: HOSPITALIST

## 2022-04-24 PROCEDURE — 84443 ASSAY THYROID STIM HORMONE: CPT | Performed by: HOSPITALIST

## 2022-04-24 PROCEDURE — 36415 COLL VENOUS BLD VENIPUNCTURE: CPT | Performed by: HOSPITALIST

## 2022-04-24 PROCEDURE — 85730 THROMBOPLASTIN TIME PARTIAL: CPT | Performed by: HOSPITALIST

## 2022-04-24 PROCEDURE — 63600175 PHARM REV CODE 636 W HCPCS: Performed by: STUDENT IN AN ORGANIZED HEALTH CARE EDUCATION/TRAINING PROGRAM

## 2022-04-24 PROCEDURE — 80061 LIPID PANEL: CPT | Performed by: HOSPITALIST

## 2022-04-24 RX ORDER — BISACODYL 5 MG
10 TABLET, DELAYED RELEASE (ENTERIC COATED) ORAL DAILY PRN
Status: DISCONTINUED | OUTPATIENT
Start: 2022-04-24 | End: 2022-05-02 | Stop reason: HOSPADM

## 2022-04-24 RX ORDER — CALCIUM GLUCONATE 20 MG/ML
1 INJECTION, SOLUTION INTRAVENOUS
Status: COMPLETED | OUTPATIENT
Start: 2022-04-24 | End: 2022-04-24

## 2022-04-24 RX ORDER — GUAIFENESIN/DEXTROMETHORPHAN 100-10MG/5
10 SYRUP ORAL EVERY 6 HOURS PRN
Status: DISCONTINUED | OUTPATIENT
Start: 2022-04-24 | End: 2022-05-02 | Stop reason: HOSPADM

## 2022-04-24 RX ORDER — TRAZODONE HYDROCHLORIDE 50 MG/1
50 TABLET ORAL NIGHTLY PRN
Status: DISCONTINUED | OUTPATIENT
Start: 2022-04-24 | End: 2022-05-02 | Stop reason: HOSPADM

## 2022-04-24 RX ORDER — ACETAMINOPHEN 500 MG
1000 TABLET ORAL EVERY 6 HOURS PRN
Status: DISCONTINUED | OUTPATIENT
Start: 2022-04-24 | End: 2022-05-02 | Stop reason: HOSPADM

## 2022-04-24 RX ORDER — SODIUM BICARBONATE 650 MG/1
650 TABLET ORAL 2 TIMES DAILY
Status: DISCONTINUED | OUTPATIENT
Start: 2022-04-24 | End: 2022-05-02 | Stop reason: HOSPADM

## 2022-04-24 RX ORDER — SIMETHICONE 80 MG
1 TABLET,CHEWABLE ORAL 3 TIMES DAILY PRN
Status: DISCONTINUED | OUTPATIENT
Start: 2022-04-24 | End: 2022-05-02 | Stop reason: HOSPADM

## 2022-04-24 RX ORDER — MAGNESIUM SULFATE 1 G/100ML
1 INJECTION INTRAVENOUS ONCE
Status: COMPLETED | OUTPATIENT
Start: 2022-04-24 | End: 2022-04-24

## 2022-04-24 RX ORDER — ASPIRIN 81 MG/1
81 TABLET ORAL DAILY
Status: DISCONTINUED | OUTPATIENT
Start: 2022-04-24 | End: 2022-05-02 | Stop reason: HOSPADM

## 2022-04-24 RX ORDER — ONDANSETRON 2 MG/ML
8 INJECTION INTRAMUSCULAR; INTRAVENOUS EVERY 6 HOURS PRN
Status: DISCONTINUED | OUTPATIENT
Start: 2022-04-24 | End: 2022-05-02 | Stop reason: HOSPADM

## 2022-04-24 RX ORDER — CARVEDILOL 12.5 MG/1
12.5 TABLET ORAL 2 TIMES DAILY
Status: DISCONTINUED | OUTPATIENT
Start: 2022-04-24 | End: 2022-05-02 | Stop reason: HOSPADM

## 2022-04-24 RX ORDER — FUROSEMIDE 10 MG/ML
80 INJECTION INTRAMUSCULAR; INTRAVENOUS ONCE
Status: COMPLETED | OUTPATIENT
Start: 2022-04-24 | End: 2022-04-24

## 2022-04-24 RX ADMIN — CARVEDILOL 12.5 MG: 12.5 TABLET, FILM COATED ORAL at 08:04

## 2022-04-24 RX ADMIN — CALCIUM GLUCONATE 1 G: 20 INJECTION, SOLUTION INTRAVENOUS at 05:04

## 2022-04-24 RX ADMIN — FUROSEMIDE 80 MG: 10 INJECTION, SOLUTION INTRAVENOUS at 09:04

## 2022-04-24 RX ADMIN — ASPIRIN 81 MG: 81 TABLET, COATED ORAL at 09:04

## 2022-04-24 RX ADMIN — SODIUM BICARBONATE 650 MG: 650 TABLET ORAL at 08:04

## 2022-04-24 RX ADMIN — CARVEDILOL 12.5 MG: 12.5 TABLET, FILM COATED ORAL at 09:04

## 2022-04-24 RX ADMIN — SODIUM BICARBONATE 650 MG: 650 TABLET ORAL at 09:04

## 2022-04-24 RX ADMIN — MAGNESIUM SULFATE HEPTAHYDRATE 1 G: 10 INJECTION, SOLUTION INTRAVENOUS at 05:04

## 2022-04-24 RX ADMIN — CALCIUM GLUCONATE 1 G: 20 INJECTION, SOLUTION INTRAVENOUS at 04:04

## 2022-04-24 NOTE — ASSESSMENT & PLAN NOTE
Will start low dose asa.    Check echo  Check carotids  Check ESR  Check lipids.    Neuro checks  Monitor on telemetry

## 2022-04-24 NOTE — SUBJECTIVE & OBJECTIVE
Past Medical History:   Diagnosis Date    CHF (congestive heart failure)     Hypertension     Mild intermittent asthma, uncomplicated     Polycystic kidney disease        Past Surgical History:   Procedure Laterality Date    LEFT HEART CATHETERIZATION         Review of patient's allergies indicates:   Allergen Reactions    Ace inhibitors Swelling    Iodine and iodide containing products Nausea And Vomiting     SHRIMP       Current Facility-Administered Medications on File Prior to Encounter   Medication    [COMPLETED] 0.9%  NaCl infusion    [COMPLETED] calcium gluconat 1 g in NS IVPB (premixed)    [COMPLETED] dextrose 5 % and 0.9 % NaCl infusion    [COMPLETED] dextrose 50% injection 25 g    [COMPLETED] dextrose 50% injection 25 g    [COMPLETED] dextrose 50% injection 25 g    [COMPLETED] insulin regular injection 10 Units    [DISCONTINUED] 0.9%  NaCl infusion    [DISCONTINUED] calcium gluconate 100 mg/mL (10%) injection 1 g    [DISCONTINUED] dextrose 10 % infusion    [DISCONTINUED] dextrose 10% bolus 125 mL    [DISCONTINUED] dextrose 10% bolus 250 mL     Current Outpatient Medications on File Prior to Encounter   Medication Sig    albuterol (PROVENTIL/VENTOLIN HFA) 90 mcg/actuation inhaler Inhale 2 puffs into the lungs every 6 (six) hours as needed for Wheezing. Rescue    carvediloL (COREG) 12.5 MG tablet Take 1 tablet (12.5 mg total) by mouth 2 (two) times daily.    spironolactone (ALDACTONE) 25 MG tablet Take 1 tablet (25 mg total) by mouth once daily.     Family History       Problem Relation (Age of Onset)    Heart attack Father    Heart disease Father    Kidney disease Father    No Known Problems Mother    Stroke Maternal Grandfather          Tobacco Use    Smoking status: Never Smoker    Smokeless tobacco: Never Used   Substance and Sexual Activity    Alcohol use: Not Currently    Drug use: Never    Sexual activity: Yes     Partners: Female     Birth control/protection: None     Review of Systems    Constitutional:  Negative for appetite change, chills, fatigue, fever and unexpected weight change.   HENT:  Negative for congestion, mouth sores, nosebleeds, sinus pain, sore throat and trouble swallowing.    Eyes:  Negative for visual disturbance.   Respiratory:  Negative for apnea, cough, chest tightness and shortness of breath.    Cardiovascular:  Negative for chest pain, palpitations and leg swelling.   Gastrointestinal:  Negative for abdominal pain, blood in stool, constipation, diarrhea, nausea and vomiting.   Endocrine: Negative for polyuria.   Genitourinary:  Negative for decreased urine volume, difficulty urinating, dysuria and frequency.   Musculoskeletal:  Negative for arthralgias, back pain, joint swelling and neck pain.   Skin:  Negative for rash.   Neurological:  Negative for seizures, syncope, light-headedness and headaches.   Hematological:  Does not bruise/bleed easily.   Psychiatric/Behavioral:  Negative for confusion and suicidal ideas.    Objective:     Vital Signs (Most Recent):  Temp: 96.6 °F (35.9 °C) (04/24/22 0015)  Pulse: 78 (04/24/22 0015)  Resp: 17 (04/24/22 0015)  BP: (!) 138/91 (04/24/22 0015)  SpO2: 99 % (04/24/22 0015)   Vital Signs (24h Range):  Temp:  [96.6 °F (35.9 °C)-98.1 °F (36.7 °C)] 96.6 °F (35.9 °C)  Pulse:  [73-96] 78  Resp:  [15-20] 17  SpO2:  [99 %-100 %] 99 %  BP: (119-159)/() 138/91     Weight: 107.3 kg (236 lb 9.6 oz)  Body mass index is 34.94 kg/m².    Physical Exam  Vitals and nursing note reviewed. Exam conducted with a chaperone present.   Constitutional:       Appearance: Normal appearance.   HENT:      Head: Atraumatic.      Mouth/Throat:      Mouth: Mucous membranes are moist.      Pharynx: Oropharynx is clear.   Eyes:      Conjunctiva/sclera: Conjunctivae normal.      Pupils: Pupils are equal, round, and reactive to light.   Cardiovascular:      Rate and Rhythm: Normal rate and regular rhythm.      Pulses: Normal pulses.      Heart sounds: Normal heart  sounds.   Pulmonary:      Effort: Pulmonary effort is normal.      Breath sounds: Normal breath sounds.   Abdominal:      General: Bowel sounds are normal. There is no distension.      Palpations: Abdomen is soft.      Tenderness: There is no abdominal tenderness. There is no guarding.      Comments: Obese and firm     Musculoskeletal:         General: Normal range of motion.   Skin:     General: Skin is warm and dry.      Coloration: Skin is not jaundiced or pale.      Findings: No bruising, lesion or rash.   Neurological:      General: No focal deficit present.      Mental Status: He is alert and oriented to person, place, and time.   Psychiatric:         Mood and Affect: Mood normal.         CRANIAL NERVES     CN III, IV, VI   Pupils are equal, round, and reactive to light.     Significant Labs: All pertinent labs within the past 24 hours have been reviewed.    Significant Imaging: I have reviewed all pertinent imaging results/findings within the past 24 hours.

## 2022-04-24 NOTE — ED PROVIDER NOTES
Encounter Date: 4/23/2022       History     Chief Complaint   Patient presents with    Tingling     Presents with a c/o having an onset of tingling in right  arm, just pta today x 1 hour.        Review of patient's allergies indicates:   Allergen Reactions    Ace inhibitors Swelling    Iodine and iodide containing products Nausea And Vomiting     SHRIMP     Past Medical History:   Diagnosis Date    Disorder of kidney and ureter     Hypertension     Mild intermittent asthma, uncomplicated      No past surgical history on file.  Family History   Problem Relation Age of Onset    Kidney disease Father     Heart attack Father     Stroke Maternal Grandfather     Cancer Neg Hx     Diabetes Neg Hx      Social History     Tobacco Use    Smoking status: Never Smoker    Smokeless tobacco: Never Used   Substance Use Topics    Alcohol use: Not Currently    Drug use: Never     Review of Systems   Constitutional: Negative.    HENT: Negative.    Eyes: Negative.    Respiratory: Negative.    Cardiovascular: Negative.    Gastrointestinal: Negative.    Endocrine: Negative.    Genitourinary: Negative.    Musculoskeletal: Negative.    Skin: Negative.    Allergic/Immunologic: Negative.    Neurological: Negative.    Hematological: Negative.    Psychiatric/Behavioral: Negative.        Physical Exam     Initial Vitals [04/23/22 1733]   BP Pulse Resp Temp SpO2   (!) 150/88 96 20 98.1 °F (36.7 °C) 99 %      MAP       --         Physical Exam    Nursing note and vitals reviewed.  Constitutional: He appears well-developed.   HENT:   Head: Normocephalic and atraumatic.   Right Ear: External ear normal.   Left Ear: External ear normal.   Nose: Nose normal.   Mouth/Throat: Oropharynx is clear and moist.   Eyes: Conjunctivae and EOM are normal. Pupils are equal, round, and reactive to light.   Neck: Neck supple.   Normal range of motion.  Cardiovascular: Normal rate, regular rhythm, normal heart sounds and intact distal pulses.    Pulmonary/Chest: Breath sounds normal.   Abdominal: Abdomen is soft. Bowel sounds are normal.   Musculoskeletal:         General: Normal range of motion.      Cervical back: Normal range of motion and neck supple.     Neurological: He is alert and oriented to person, place, and time. He has normal strength and normal reflexes. GCS score is 15. GCS eye subscore is 4. GCS verbal subscore is 5. GCS motor subscore is 6.   Skin: Skin is warm. Capillary refill takes 2 to 3 seconds.   Psychiatric: He has a normal mood and affect. His behavior is normal. Judgment and thought content normal.         Medical Screening Exam   See Full Note    ED Course   Procedures  Labs Reviewed   URINALYSIS, REFLEX TO URINE CULTURE - Abnormal; Notable for the following components:       Result Value    Protein,   (*)     Blood, UA Moderate (*)     All other components within normal limits   COMPREHENSIVE METABOLIC PANEL - Abnormal; Notable for the following components:    Potassium 6.1 (*)     Chloride 108 (*)     Glucose 107 (*)     BUN 72 (*)     Creatinine 6.51 (*)     Calcium 7.3 (*)     eGFR 9 (*)     All other components within normal limits   CBC WITH DIFFERENTIAL - Abnormal; Notable for the following components:    RBC 4.11 (*)     Hemoglobin 11.6 (*)     Hematocrit 37.7 (*)     MCHC 30.8 (*)     RDW 14.9 (*)     Neutrophils % 78.6 (*)     Lymphocytes % 13.3 (*)     Lymphocytes, Absolute 0.62 (*)     All other components within normal limits   URINALYSIS, MICROSCOPIC - Abnormal; Notable for the following components:    RBC, UA 10-15 (*)     Bacteria, UA Occasional (*)     All other components within normal limits   BASIC METABOLIC PANEL - Abnormal; Notable for the following components:    Potassium 7.0 (*)     Chloride 109 (*)     BUN 73 (*)     Creatinine 6.26 (*)     Calcium 7.0 (*)     eGFR 10 (*)     All other components within normal limits   MAGNESIUM - Abnormal; Notable for the following components:    Magnesium 1.2  (*)     All other components within normal limits   TROPONIN I - Normal   DRUG SCREEN, URINE (BEAKER) - Normal    Narrative:     The results of screening tests should be considered presumptive. Confirmatory testing is available upon request.    Cutoff Points:  PCP:         25ng/mL  AMPH:        500ng/mL  ITZEL:        200ng/mL  DUKE:        200ng/mL  THC:         50ng/mL  CAROLANN:         300ng/mL  OPI:         2000ng/mL   CBC W/ AUTO DIFFERENTIAL    Narrative:     The following orders were created for panel order CBC auto differential.  Procedure                               Abnormality         Status                     ---------                               -----------         ------                     CBC with Differential[[id number]]        Abnormal            Final result                 Please view results for these tests on the individual orders.   POCT GLUCOSE MONITORING CONTINUOUS        ECG Results          EKG 12-lead (In process)  Result time 04/23/22 17:46:17    In process by Interface, Lab In [facility] (04/23/22 17:46:17)                 Narrative:    Test Reason : M79.603,    Vent. Rate : 087 BPM     Atrial Rate : 087 BPM     P-R Int : 178 ms          QRS Dur : 100 ms      QT Int : 396 ms       P-R-T Axes : 067 013 087 degrees     QTc Int : 476 ms    Sinus rhythm with Fusion complexes  Possible Anterior infarct ,age undetermined  Abnormal ECG  No previous ECGs available    Referred By: [email or web address]   SELF           Confirmed By:                             Imaging Results          X-Ray Chest 1 View (Final result)  Result time 04/23/22 18:29:06    Final result by Germain Uribe DO (04/23/22 18:29:06)                 Impression:      Cardiomegaly.  No dinh pulmonary edema or focal consolidating pneumonia.    Point of Service: [facility]      Electronically signed by: Germain Uribe  Date:    04/23/2022  Time:    18:29             Narrative:    EXAMINATION:  XR CHEST 1 VIEW    CLINICAL  HISTORY:  Cough, unspecified    COMPARISON:  None    TECHNIQUE:  Frontal view/views of the chest.    FINDINGS:  Mild to moderate cardiomegaly.  No dinh pulmonary edema or focal consolidating pneumonia.  Visualized osseous and surrounding soft tissue structures demonstrate no acute abnormality.                               CT Head Without Contrast (Final result)  Result time 04/23/22 17:59:20    Final result by Germain Uribe DO (04/23/22 17:59:20)                 Impression:      No convincing imaging evidence of acute intracranial abnormality.  Probable chronic microvascular ischemic change.  Bilateral proptosis is noted which can be seen with thyroid disease.  Left frontal sinus disease.    The CT exam was performed using one or more of the following dose    reduction techniques- Automated exposure control, adjustment of the mA    and/or kV according to patient size, and/or use of iterative    reconstructed technique.    Point of Service: [facility]      Electronically signed by: Germain Uribe  Date:    04/23/2022  Time:    17:59             Narrative:    EXAMINATION:  CT HEAD WITHOUT CONTRAST    CLINICAL HISTORY:  Transient ischemic attack (TIA);    COMPARISON:  None    TECHNIQUE:  Multiple axial tomographic images of the brain were obtained without the use of intravenous contrast.    FINDINGS:  Midline structures are nondisplaced.  No convincing evidence of acute intracranial hemorrhage.  No convincing evidence of hydrocephalus.  Probable prominent perivascular space along the inferior right lentiform nucleus.  Mild periventricular and subcortical hypoattenuation noted which is nonspecific but consistent with chronic microvascular ischemic change. Less likely considerations include demyelinating process and vasculitis. Bilateral proptosis is noted which can be seen with thyroid disease.  Left frontal sinus opacification.                                 Medications   calcium gluconat 1 g in NS  IVPB (premixed) (1 g Intravenous New Bag 4/23/22 2022)   insulin regular injection 10 Units (10 Units Intravenous Given 4/23/22 2022)   dextrose 50% injection 25 g (25 g Intravenous Given 4/23/22 2019)   0.9%  NaCl infusion (1,000 mLs Intravenous New Bag 4/23/22 2009)      Spoke with Dr Man [email or web address]                 Clinical Impression:        1. Hyperkalemia  2. Acute renal insuifficiency        Tiburcio Perez MD  04/23/22 2056       Tiburcio Perez MD  04/23/22 2102

## 2022-04-24 NOTE — HPI
58 yo M presents to [facility] ED with right arm tingling for one hour prior to coming to the ED.  Patient says he wasjust sitting in his recliner and his wife became concerned and took him to the ED.  Patient had a stat head CT which showed proptosis and left frontal sinusitis.  He has no HA or vision change nor does he c/o sinus pressure.      His labs showed a K of 7 along with worsening renal function with BUN 73 and creat of 6.  Patient states he sees Dr. Cas Guardado for PCKD and that his father had PCKD as well.  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 Montaño after a LHC which did not show any obstructing CAD.  Patient states that he has not had any change in UOP but admits he has not followed with Dr. Guardado in quite some time.      Patient was given a Delmer's cocktail for hyperkalemia and EKG did not have peaked Twaves and initial troponin was normal.  Repeat K was 5.  CXR did show cardiomegally but no infiltrates.

## 2022-04-24 NOTE — ASSESSMENT & PLAN NOTE
Gentle hydration  Stop diuretic  Continue BB but no nephrotoxic agents  Renal US  Will give 1 g mag and recheck all labs  Monitor on telemetry  Appreciate nephrology assistance.

## 2022-04-24 NOTE — H&P
[address] Medicine  History & Physical    Patient Name: Catalina Stephens  MRN: [id number]  Patient Class: IP- Inpatient  Admission Date: 4/24/2022  Attending Physician: Caleb Sabillon MD   Primary Care Provider: Ja Hernandez MD         Patient information was obtained from patient, past medical records and ER records.     Subjective:     Principal Problem:Acute kidney injury superimposed on chronic kidney disease    Chief Complaint:   Chief Complaint   Patient presents with    Tingling     RUE          HPI: 58 yo M presents to [facility] ED with right arm tingling for one hour prior to coming to the ED.  Patient says he wasjust sitting in his recliner and his wife became concerned and took him to the ED.  Patient had a stat head CT which showed proptosis and left frontal sinusitis.  He has no HA or vision change nor does he c/o sinus pressure.      His labs showed a K of 7 along with worsening renal function with BUN 73 and creat of 6.  Patient states he sees Dr. Cas Guardado for PCKD and that his father had PCKD as well.  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 Montaño after a C which did not show any obstructing CAD.  Patient states that he has not had any change in UOP but admits he has not followed with Dr. Guardado in quite some time.      Patient was given a Delmer's cocktail for hyperkalemia and EKG did not have peaked Twaves and initial troponin was normal.  Repeat K was 5.  CXR did show cardiomegally but no infiltrates.        Past Medical History:   Diagnosis Date    CHF (congestive heart failure)     Hypertension     Mild intermittent asthma, uncomplicated     Polycystic kidney disease        Past Surgical History:   Procedure Laterality Date    LEFT HEART CATHETERIZATION         Review of patient's allergies indicates:   Allergen Reactions    Ace  inhibitors Swelling    Iodine and iodide containing products Nausea And Vomiting     SHRIMP       Current Facility-Administered Medications on File Prior to Encounter   Medication    [COMPLETED] 0.9%  NaCl infusion    [COMPLETED] calcium gluconat 1 g in NS IVPB (premixed)    [COMPLETED] dextrose 5 % and 0.9 % NaCl infusion    [COMPLETED] dextrose 50% injection 25 g    [COMPLETED] dextrose 50% injection 25 g    [COMPLETED] dextrose 50% injection 25 g    [COMPLETED] insulin regular injection 10 Units    [DISCONTINUED] 0.9%  NaCl infusion    [DISCONTINUED] calcium gluconate 100 mg/mL (10%) injection 1 g    [DISCONTINUED] dextrose 10 % infusion    [DISCONTINUED] dextrose 10% bolus 125 mL    [DISCONTINUED] dextrose 10% bolus 250 mL     Current Outpatient Medications on File Prior to Encounter   Medication Sig    albuterol (PROVENTIL/VENTOLIN HFA) 90 mcg/actuation inhaler Inhale 2 puffs into the lungs every 6 (six) hours as needed for Wheezing. Rescue    carvediloL (COREG) 12.5 MG tablet Take 1 tablet (12.5 mg total) by mouth 2 (two) times daily.    spironolactone (ALDACTONE) 25 MG tablet Take 1 tablet (25 mg total) by mouth once daily.     Family History       Problem Relation (Age of Onset)    Heart attack Father    Heart disease Father    Kidney disease Father    No Known Problems Mother    Stroke Maternal Grandfather          Tobacco Use    Smoking status: Never Smoker    Smokeless tobacco: Never Used   Substance and Sexual Activity    Alcohol use: Not Currently    Drug use: Never    Sexual activity: Yes     Partners: Female     Birth control/protection: None     Review of Systems   Constitutional:  Negative for appetite change, chills, fatigue, fever and unexpected weight change.   HENT:  Negative for congestion, mouth sores, nosebleeds, sinus pain, sore throat and trouble swallowing.    Eyes:  Negative for visual disturbance.   Respiratory:  Negative for apnea, cough, chest tightness and  shortness of breath.    Cardiovascular:  Negative for chest pain, palpitations and leg swelling.   Gastrointestinal:  Negative for abdominal pain, blood in stool, constipation, diarrhea, nausea and vomiting.   Endocrine: Negative for polyuria.   Genitourinary:  Negative for decreased urine volume, difficulty urinating, dysuria and frequency.   Musculoskeletal:  Negative for arthralgias, back pain, joint swelling and neck pain.   Skin:  Negative for rash.   Neurological:  Negative for seizures, syncope, light-headedness and headaches.   Hematological:  Does not bruise/bleed easily.   Psychiatric/Behavioral:  Negative for confusion and suicidal ideas.    Objective:     Vital Signs (Most Recent):  Temp: 96.6 °F (35.9 °C) (04/24/22 0015)  Pulse: 78 (04/24/22 0015)  Resp: 17 (04/24/22 0015)  BP: (!) 138/91 (04/24/22 0015)  SpO2: 99 % (04/24/22 0015)   Vital Signs (24h Range):  Temp:  [96.6 °F (35.9 °C)-98.1 °F (36.7 °C)] 96.6 °F (35.9 °C)  Pulse:  [73-96] 78  Resp:  [15-20] 17  SpO2:  [99 %-100 %] 99 %  BP: (119-159)/() 138/91     Weight: 107.3 kg (236 lb 9.6 oz)  Body mass index is 34.94 kg/m².    Physical Exam  Vitals and nursing note reviewed. Exam conducted with a chaperone present.   Constitutional:       Appearance: Normal appearance.   HENT:      Head: Atraumatic.      Mouth/Throat:      Mouth: Mucous membranes are moist.      Pharynx: Oropharynx is clear.   Eyes:      Conjunctiva/sclera: Conjunctivae normal.      Pupils: Pupils are equal, round, and reactive to light.   Cardiovascular:      Rate and Rhythm: Normal rate and regular rhythm.      Pulses: Normal pulses.      Heart sounds: Normal heart sounds.   Pulmonary:      Effort: Pulmonary effort is normal.      Breath sounds: Normal breath sounds.   Abdominal:      General: Bowel sounds are normal. There is no distension.      Palpations: Abdomen is soft.      Tenderness: There is no abdominal tenderness. There is no guarding.      Comments: Obese and  firm     Musculoskeletal:         General: Normal range of motion.   Skin:     General: Skin is warm and dry.      Coloration: Skin is not jaundiced or pale.      Findings: No bruising, lesion or rash.   Neurological:      General: No focal deficit present.      Mental Status: He is alert and oriented to person, place, and time.   Psychiatric:         Mood and Affect: Mood normal.         CRANIAL NERVES     CN III, IV, VI   Pupils are equal, round, and reactive to light.     Significant Labs: All pertinent labs within the past 24 hours have been reviewed.    Significant Imaging: I have reviewed all pertinent imaging results/findings within the past 24 hours.    Assessment/Plan:     * Acute kidney injury superimposed on chronic kidney disease  Gentle hydration  Stop diuretic  Continue BB but no nephrotoxic agents  Renal US  Will give 1 g mag and recheck all labs  Monitor on telemetry  Appreciate nephrology assistance.        Hyperkalemia  Stop aldactone  Recheck k now down to 5.3 after Delmer's cocktail.       TIA (transient ischemic attack)  Will start low dose asa.    Check echo  Check carotids  Check ESR  Check lipids.    Neuro checks  Monitor on telemetry          VTE Risk Mitigation (From admission, onward)    None             Nai Conway MD  Department of Hospital Medicine   [facility] - [address] [place] [address]

## 2022-04-25 LAB
ALBUMIN SERPL BCP-MCNC: 3.3 G/DL (ref 3.5–5)
ANION GAP SERPL CALCULATED.3IONS-SCNC: 21 MMOL/L (ref 7–16)
BASOPHILS # BLD AUTO: 0.03 K/UL (ref 0–0.2)
BASOPHILS NFR BLD AUTO: 0.6 % (ref 0–1)
BUN SERPL-MCNC: 78 MG/DL (ref 7–18)
BUN/CREAT SERPL: 13 (ref 6–20)
CALCIUM SERPL-MCNC: 7.4 MG/DL (ref 8.5–10.1)
CHLORIDE SERPL-SCNC: 107 MMOL/L (ref 98–107)
CO2 SERPL-SCNC: 18 MMOL/L (ref 21–32)
CREAT SERPL-MCNC: 6.04 MG/DL (ref 0.7–1.3)
CREAT UR-MCNC: 50 MG/DL (ref 39–259)
DIFFERENTIAL METHOD BLD: ABNORMAL
EOSINOPHIL # BLD AUTO: 0.12 K/UL (ref 0–0.5)
EOSINOPHIL NFR BLD AUTO: 2.4 % (ref 1–4)
ERYTHROCYTE [DISTWIDTH] IN BLOOD BY AUTOMATED COUNT: 14.8 % (ref 11.5–14.5)
GLUCOSE SERPL-MCNC: 72 MG/DL (ref 74–106)
HCT VFR BLD AUTO: 36.8 % (ref 40–54)
HGB BLD-MCNC: 11 G/DL (ref 13.5–18)
IMM GRANULOCYTES # BLD AUTO: 0.01 K/UL (ref 0–0.04)
IMM GRANULOCYTES NFR BLD: 0.2 % (ref 0–0.4)
LYMPHOCYTES # BLD AUTO: 1.05 K/UL (ref 1–4.8)
LYMPHOCYTES NFR BLD AUTO: 21 % (ref 27–41)
MCH RBC QN AUTO: 27.6 PG (ref 27–31)
MCHC RBC AUTO-ENTMCNC: 29.9 G/DL (ref 32–36)
MCV RBC AUTO: 92.2 FL (ref 80–96)
MONOCYTES # BLD AUTO: 0.42 K/UL (ref 0–0.8)
MONOCYTES NFR BLD AUTO: 8.4 % (ref 2–6)
MPC BLD CALC-MCNC: 10.9 FL (ref 9.4–12.4)
NEUTROPHILS # BLD AUTO: 3.36 K/UL (ref 1.8–7.7)
NEUTROPHILS NFR BLD AUTO: 67.4 % (ref 53–65)
NRBC # BLD AUTO: 0 X10E3/UL
NRBC, AUTO (.00): 0 %
PHOSPHATE SERPL-MCNC: 6.5 MG/DL (ref 2.5–4.5)
PLATELET # BLD AUTO: 188 K/UL (ref 150–400)
POTASSIUM SERPL-SCNC: 5.7 MMOL/L (ref 3.5–5.1)
PROT UR-MCNC: 47.7 MG/DL (ref 0–11.9)
PROT/CREAT 24H UR-RTO: 0.95
PTH-INTACT SERPL-MCNC: 592.7 PG/ML (ref 18.4–80.1)
RBC # BLD AUTO: 3.99 M/UL (ref 4.6–6.2)
SODIUM SERPL-SCNC: 140 MMOL/L (ref 136–145)
URATE SERPL-MCNC: 8.4 MG/DL (ref 3.5–7.2)
WBC # BLD AUTO: 4.99 K/UL (ref 4.5–11)

## 2022-04-25 PROCEDURE — 25000003 PHARM REV CODE 250: Performed by: FAMILY MEDICINE

## 2022-04-25 PROCEDURE — 11000001 HC ACUTE MED/SURG PRIVATE ROOM

## 2022-04-25 PROCEDURE — 84550 ASSAY OF BLOOD/URIC ACID: CPT | Performed by: INTERNAL MEDICINE

## 2022-04-25 PROCEDURE — 84156 ASSAY OF PROTEIN URINE: CPT | Performed by: INTERNAL MEDICINE

## 2022-04-25 PROCEDURE — 85025 COMPLETE CBC W/AUTO DIFF WBC: CPT | Performed by: STUDENT IN AN ORGANIZED HEALTH CARE EDUCATION/TRAINING PROGRAM

## 2022-04-25 PROCEDURE — 99232 PR SUBSEQUENT HOSPITAL CARE,LEVL II: ICD-10-PCS | Mod: ,,, | Performed by: FAMILY MEDICINE

## 2022-04-25 PROCEDURE — 36415 COLL VENOUS BLD VENIPUNCTURE: CPT | Performed by: INTERNAL MEDICINE

## 2022-04-25 PROCEDURE — 80069 RENAL FUNCTION PANEL: CPT | Performed by: STUDENT IN AN ORGANIZED HEALTH CARE EDUCATION/TRAINING PROGRAM

## 2022-04-25 PROCEDURE — 25000003 PHARM REV CODE 250: Performed by: HOSPITALIST

## 2022-04-25 PROCEDURE — 83970 ASSAY OF PARATHORMONE: CPT | Performed by: INTERNAL MEDICINE

## 2022-04-25 PROCEDURE — 99232 SBSQ HOSP IP/OBS MODERATE 35: CPT | Mod: ,,, | Performed by: FAMILY MEDICINE

## 2022-04-25 RX ADMIN — ASPIRIN 81 MG: 81 TABLET, COATED ORAL at 09:04

## 2022-04-25 RX ADMIN — SODIUM POLYSTYRENE SULFONATE 15 G: 15 SUSPENSION ORAL; RECTAL at 09:04

## 2022-04-25 RX ADMIN — CARVEDILOL 12.5 MG: 12.5 TABLET, FILM COATED ORAL at 09:04

## 2022-04-25 RX ADMIN — SODIUM BICARBONATE 650 MG: 650 TABLET ORAL at 09:04

## 2022-04-25 NOTE — PLAN OF CARE
Problem: Adult Inpatient Plan of Care  Goal: Plan of Care Review  Outcome: Ongoing, Progressing  Goal: Absence of Hospital-Acquired Illness or Injury  Outcome: Ongoing, Progressing  Goal: Optimal Comfort and Wellbeing  Outcome: Ongoing, Progressing     Problem: Fall Injury Risk  Goal: Absence of Fall and Fall-Related Injury  Outcome: Ongoing, Progressing

## 2022-04-25 NOTE — CONSULTS
[address]  Nephrology  Consult Note    Patient Name: Catalina Stephens  MRN: [id number]  Admission Date: 4/24/2022  Hospital Length of Stay: 0 days  Attending Provider: Cali Dodd Jr., MD   Primary Care Physician: Ja Hernandez MD  Principal Problem:Acute kidney injury superimposed on chronic kidney disease    Consults  Subjective:     HPI: This is a 58 yo male who has a history of CKD who was referred to this facility for the evaluation and management of CKD.The patient has a family history polycystic kidney disease.    Past Medical History:   Diagnosis Date    CHF (congestive heart failure)     Chronic systolic (congestive) heart failure     Hypertension     Mild intermittent asthma, uncomplicated     Polycystic kidney disease        Past Surgical History:   Procedure Laterality Date    LEFT HEART CATHETERIZATION         Review of patient's allergies indicates:   Allergen Reactions    Ace inhibitors Swelling    Iodine and iodide containing products Nausea And Vomiting     SHRIMP     Current Facility-Administered Medications   Medication Frequency    acetaminophen tablet 1,000 mg Q6H PRN    aspirin EC tablet 81 mg Daily    bisacodyL EC tablet 10 mg Daily PRN    carvediloL tablet 12.5 mg BID    dextromethorphan-guaiFENesin  mg/5 ml liquid 10 mL Q6H PRN    ondansetron injection 8 mg Q6H PRN    simethicone chewable tablet 80 mg TID PRN    sodium bicarbonate tablet 650 mg BID    traZODone tablet 50 mg Nightly PRN     Family History     Problem Relation (Age of Onset)    Heart attack Father    Heart disease Father    Kidney disease Father    No Known Problems Mother    Stroke Maternal Grandfather        Tobacco Use    Smoking status: Never Smoker    Smokeless tobacco: Never Used   Substance and Sexual Activity    Alcohol use: Not Currently    Drug use: Never    Sexual activity: Yes     Partners: Female     Birth control/protection: None     Review of  Systems  Objective:     Vital Signs (Most Recent):  Temp: 96.9 °F (36.1 °C) (04/24/22 1901)  Pulse: 75 (04/24/22 1901)  Resp: 18 (04/24/22 1901)  BP: 120/86 (04/24/22 1901)  SpO2: 99 % (04/24/22 1901)  O2 Device (Oxygen Therapy): room air (04/24/22 1603) Vital Signs (24h Range):  Temp:  [96.3 °F (35.7 °C)-98.4 °F (36.9 °C)] 96.9 °F (36.1 °C)  Pulse:  [72-78] 75  Resp:  [16-20] 18  SpO2:  [98 %-100 %] 99 %  BP: (116-138)/(74-91) 120/86     Weight: 107.3 kg (236 lb 9.6 oz) (04/24/22 0015)  Body mass index is 34.94 kg/m².  Body surface area is 2.29 meters squared.    I/O last 3 completed shifts:  In: 150 [I.V.:100; IV Piggyback:50]  Out: 500 [Urine:500]    Physical Exam   Lungs-crackles at the bases  Cor-no murmur  Abd-soft,nontender    Significant Labs:  CBC:   Recent Labs   Lab 04/23/22  1742   WBC 4.67   RBC 4.11*   HGB 11.6*   HCT 37.7*      MCV 91.7   MCH 28.2   MCHC 30.8*     CMP:   Recent Labs   Lab 04/24/22  0706 04/24/22  1516   GLU 85 86   CALCIUM 7.2* 7.2*   ALBUMIN 3.4*  --    PROT 6.5  --     139   K 6.1* 5.6*   CO2 18* 18*    106   BUN 72* 75*   CREATININE 5.79* 5.97*   ALKPHOS 60  --    ALT 28  --    AST 16  --    BILITOT 0.4  --      All labs within the past 24 hours have been reviewed.    Significant Imaging:      Assessment/Plan:     Active Diagnoses:    Diagnosis Date Noted POA    PRINCIPAL PROBLEM:  Acute kidney injury superimposed on chronic kidney disease [N17.9, N18.9] 04/24/2022 Yes    TIA (transient ischemic attack) [G45.9] 04/24/2022 Yes    Hyperkalemia [E87.5] 04/24/2022 Yes      Problems Resolved During this Admission:       Plan:  Follow the renal function   Explain the options for renal replacement therapy    Thank you for your consult. I will follow-up with patient. Please contact us if you have any additional questions.    Petr Real MD  Nephrology  [facility] - [address] [place] [address]

## 2022-04-26 LAB
ANION GAP SERPL CALCULATED.3IONS-SCNC: 15 MMOL/L (ref 7–16)
BUN SERPL-MCNC: 77 MG/DL (ref 7–18)
BUN/CREAT SERPL: 12 (ref 6–20)
CALCIUM SERPL-MCNC: 7.4 MG/DL (ref 8.5–10.1)
CHLORIDE SERPL-SCNC: 109 MMOL/L (ref 98–107)
CO2 SERPL-SCNC: 20 MMOL/L (ref 21–32)
CREAT SERPL-MCNC: 6.42 MG/DL (ref 0.7–1.3)
GLUCOSE SERPL-MCNC: 78 MG/DL (ref 74–106)
POTASSIUM SERPL-SCNC: 5.8 MMOL/L (ref 3.5–5.1)
POTASSIUM SERPL-SCNC: 6 MMOL/L (ref 3.5–5.1)
SODIUM SERPL-SCNC: 138 MMOL/L (ref 136–145)

## 2022-04-26 PROCEDURE — 99233 PR SUBSEQUENT HOSPITAL CARE,LEVL III: ICD-10-PCS | Mod: ,,, | Performed by: FAMILY MEDICINE

## 2022-04-26 PROCEDURE — 25000003 PHARM REV CODE 250: Performed by: HOSPITALIST

## 2022-04-26 PROCEDURE — 99222 1ST HOSP IP/OBS MODERATE 55: CPT | Mod: ,,, | Performed by: SURGERY

## 2022-04-26 PROCEDURE — 25000003 PHARM REV CODE 250: Performed by: FAMILY MEDICINE

## 2022-04-26 PROCEDURE — 99222 PR INITIAL HOSPITAL CARE,LEVL II: ICD-10-PCS | Mod: ,,, | Performed by: SURGERY

## 2022-04-26 PROCEDURE — 25000003 PHARM REV CODE 250: Performed by: INTERNAL MEDICINE

## 2022-04-26 PROCEDURE — 99233 SBSQ HOSP IP/OBS HIGH 50: CPT | Mod: ,,, | Performed by: FAMILY MEDICINE

## 2022-04-26 PROCEDURE — 36415 COLL VENOUS BLD VENIPUNCTURE: CPT | Performed by: FAMILY MEDICINE

## 2022-04-26 PROCEDURE — 11000001 HC ACUTE MED/SURG PRIVATE ROOM

## 2022-04-26 PROCEDURE — 80048 BASIC METABOLIC PNL TOTAL CA: CPT | Performed by: FAMILY MEDICINE

## 2022-04-26 PROCEDURE — 84132 ASSAY OF SERUM POTASSIUM: CPT | Performed by: FAMILY MEDICINE

## 2022-04-26 RX ORDER — SEVELAMER CARBONATE 800 MG/1
800 TABLET, FILM COATED ORAL
Status: DISCONTINUED | OUTPATIENT
Start: 2022-04-26 | End: 2022-05-02 | Stop reason: HOSPADM

## 2022-04-26 RX ADMIN — CARVEDILOL 12.5 MG: 12.5 TABLET, FILM COATED ORAL at 09:04

## 2022-04-26 RX ADMIN — SODIUM POLYSTYRENE SULFONATE 30 G: 15 SUSPENSION ORAL; RECTAL at 11:04

## 2022-04-26 RX ADMIN — ASPIRIN 81 MG: 81 TABLET, COATED ORAL at 09:04

## 2022-04-26 RX ADMIN — SODIUM BICARBONATE 650 MG: 650 TABLET ORAL at 09:04

## 2022-04-26 RX ADMIN — SODIUM BICARBONATE 650 MG: 650 TABLET ORAL at 08:04

## 2022-04-26 RX ADMIN — SEVELAMER CARBONATE 800 MG: 800 TABLET, FILM COATED ORAL at 09:04

## 2022-04-26 RX ADMIN — CARVEDILOL 12.5 MG: 12.5 TABLET, FILM COATED ORAL at 08:04

## 2022-04-26 RX ADMIN — SEVELAMER CARBONATE 800 MG: 800 TABLET, FILM COATED ORAL at 04:04

## 2022-04-26 NOTE — PROGRESS NOTES
[address]  Nephrology  Progress Note    Patient Name: Catalina Stephens  MRN: [id number]  Admission Date: 4/24/2022  Hospital Length of Stay: 1 days  Attending Provider: Cali Dodd Jr., MD   Primary Care Physician: Ja Hernandez MD  Principal Problem:Acute kidney injury superimposed on chronic kidney disease    Consults  Subjective:     Interval History: The patient has no complaints today.    Review of patient's allergies indicates:   Allergen Reactions    Ace inhibitors Swelling    Iodine and iodide containing products Nausea And Vomiting     SHRIMP     Current Facility-Administered Medications   Medication Frequency    acetaminophen tablet 1,000 mg Q6H PRN    aspirin EC tablet 81 mg Daily    bisacodyL EC tablet 10 mg Daily PRN    carvediloL tablet 12.5 mg BID    dextromethorphan-guaiFENesin  mg/5 ml liquid 10 mL Q6H PRN    ondansetron injection 8 mg Q6H PRN    simethicone chewable tablet 80 mg TID PRN    sodium bicarbonate tablet 650 mg BID    traZODone tablet 50 mg Nightly PRN       Objective:     Vital Signs (Most Recent):  Temp: 97.7 °F (36.5 °C) (04/25/22 1937)  Pulse: 72 (04/25/22 1937)  Resp: 18 (04/25/22 1937)  BP: 118/80 (04/25/22 1937)  SpO2: 96 % (04/25/22 1937)  O2 Device (Oxygen Therapy): room air (04/24/22 1603) Vital Signs (24h Range):  Temp:  [97.3 °F (36.3 °C)-98.2 °F (36.8 °C)] 97.7 °F (36.5 °C)  Pulse:  [72-81] 72  Resp:  [18] 18  SpO2:  [96 %-99 %] 96 %  BP: (113-133)/(78-92) 118/80     Weight: 107.3 kg (236 lb 8.9 oz) (04/25/22 0455)  Body mass index is 34.93 kg/m².  Body surface area is 2.29 meters squared.    I/O last 3 completed shifts:  In: 50 [IV Piggyback:50]  Out: 2675 [Urine:2675]    Physical Exam   Lungs-clear  Cor-no murmur or rub  Abd-soft  Ext-Trace pitting pretibial edema bilaterally    Significant Labs:sure  CBC:   Recent Labs   Lab 04/25/22  0302   WBC 4.99   RBC 3.99*   HGB 11.0*   HCT 36.8*      MCV 92.2   MCH  27.6   MCHC 29.9*     CMP:   Recent Labs   Lab 04/24/22  0706 04/24/22  1516 04/25/22  0302   GLU 85   < > 72*   CALCIUM 7.2*   < > 7.4*   ALBUMIN 3.4*  --  3.3*   PROT 6.5  --   --       < > 140   K 6.1*   < > 5.7*   CO2 18*   < > 18*      < > 107   BUN 72*   < > 78*   CREATININE 5.79*   < > 6.04*   ALKPHOS 60  --   --    ALT 28  --   --    AST 16  --   --    BILITOT 0.4  --   --     < > = values in this interval not displayed.       Significant Imaging:      Assessment/Plan:     Active Diagnoses:    Diagnosis Date Noted POA    PRINCIPAL PROBLEM:  Acute kidney injury superimposed on chronic kidney disease [N17.9, N18.9] 04/24/2022 Yes    TIA (transient ischemic attack) [G45.9] 04/24/2022 Yes    Hyperkalemia [E87.5] 04/24/2022 Yes      Problems Resolved During this Admission:       Imp:  Patient has autosomal dominant polycystic disease and will require renal replacement therapy  Plan  Await patient's decision on which option he would like to proceed    Thank you for your consult. I will follow-up with patient. Please contact us if you have any additional questions.    Petr Real MD  Nephrology  [address]

## 2022-04-26 NOTE — ASSESSMENT & PLAN NOTE
Stop aldactone  Recheck k now down to 5.3 after Delmer's cocktail.     4/25 - back to 5.7. Will give dose of kayexalate.

## 2022-04-26 NOTE — ASSESSMENT & PLAN NOTE
Gentle hydration  Stop diuretic  Continue BB but no nephrotoxic agents  Renal US  Will give 1 g mag and recheck all labs  Monitor on telemetry  Appreciate nephrology assistance.      4/25 - no improvement in creatinine. He is making urine.

## 2022-04-26 NOTE — NURSING
Told CASPER Ignacio that Dr. Dodd wanted to see if surgery could put an HD cath in today.  Told Sandee that Dr. Dodd had told the patient not to eat until he heard from us.

## 2022-04-26 NOTE — CONSULTS
[address]  General Surgery  Consult Note    Patient Name: Catalina Stephens  MRN: [id number]  Code Status: Full Code  Admission Date: 4/24/2022  Hospital Length of Stay: 2 days  Attending Physician: Cali Dodd Jr., MD  Primary Care Provider: Ja Hernandez MD    Patient information was obtained from patient and ER records.     Inpatient consult to General Surgery  Consult performed by: MIGUEL Tan  Consult ordered by: Cali Dodd Jr., MD  Reason for consult: HD cath  Assessment/Recommendations: Tunneled HD cath in OR per dr geraldine piper        Subjective:     Principal Problem: Acute kidney injury superimposed on chronic kidney disease    History of Present Illness: 58 y/o male with autosomal polycystic disease acute on chronic kidney disease  Admitted with hyperkalemia, worsening renal function  Potassium is 6.0 has had 30 gram of kayexelate recheck K pending  Patient has been trying to decide between hemodialysis/peritoneal dialysis  Patient has agreed to hemodialysis  General Surgery consulted for hemodialysis catheter, patient not NPO  PMH:  asthma, cardiomyopathy      No current facility-administered medications on file prior to encounter.     Current Outpatient Medications on File Prior to Encounter   Medication Sig    albuterol (PROVENTIL/VENTOLIN HFA) 90 mcg/actuation inhaler Inhale 2 puffs into the lungs every 6 (six) hours as needed for Wheezing. Rescue    carvediloL (COREG) 12.5 MG tablet Take 1 tablet (12.5 mg total) by mouth 2 (two) times daily.    spironolactone (ALDACTONE) 25 MG tablet Take 1 tablet (25 mg total) by mouth once daily.       Review of patient's allergies indicates:   Allergen Reactions    Ace inhibitors Swelling    Iodine and iodide containing products Nausea And Vomiting     SHRIMP       Past Medical History:   Diagnosis Date    CHF (congestive heart failure)     Chronic systolic (congestive) heart failure     Hypertension      Mild intermittent asthma, uncomplicated     Polycystic kidney disease      Past Surgical History:   Procedure Laterality Date    LEFT HEART CATHETERIZATION       Family History       Problem Relation (Age of Onset)    Heart attack Father    Heart disease Father    Kidney disease Father    No Known Problems Mother    Stroke Maternal Grandfather          Tobacco Use    Smoking status: Never Smoker    Smokeless tobacco: Never Used   Substance and Sexual Activity    Alcohol use: Not Currently    Drug use: Never    Sexual activity: Yes     Partners: Female     Birth control/protection: None     Review of Systems  Objective:     Vital Signs (Most Recent):  Temp: 97.5 °F (36.4 °C) (04/26/22 1100)  Pulse: 85 (04/26/22 1100)  Resp: 18 (04/26/22 1100)  BP: 116/74 (04/26/22 1100)  SpO2: 98 % (04/26/22 1100) Vital Signs (24h Range):  Temp:  [97.5 °F (36.4 °C)-98.3 °F (36.8 °C)] 97.5 °F (36.4 °C)  Pulse:  [72-86] 85  Resp:  [18-20] 18  SpO2:  [96 %-99 %] 98 %  BP: (116-130)/(74-85) 116/74     Weight: 105.4 kg (232 lb 5.8 oz)  Body mass index is 34.31 kg/m².    Physical Exam  Vitals and nursing note reviewed. Exam conducted with a chaperone present.   HENT:      Head: Normocephalic.      Nose: Nose normal.   Eyes:      Conjunctiva/sclera: Conjunctivae normal.   Cardiovascular:      Rate and Rhythm: Normal rate.   Pulmonary:      Effort: Pulmonary effort is normal.   Abdominal:      General: Abdomen is flat.      Palpations: Abdomen is soft.   Skin:     General: Skin is warm and dry.      Capillary Refill: Capillary refill takes less than 2 seconds.   Neurological:      Mental Status: He is alert and oriented to person, place, and time.       Significant Labs:  I have reviewed all pertinent lab results within the past 24 hours.  CBC:   Recent Labs   Lab 04/25/22  0302   WBC 4.99   RBC 3.99*   HGB 11.0*   HCT 36.8*      MCV 92.2   MCH 27.6   MCHC 29.9*     BMP:   Recent Labs   Lab 04/24/22  0706 04/24/22  1516  04/26/22  0336   GLU 85   < > 78      < > 138   K 6.1*   < > 6.0*      < > 109*   CO2 18*   < > 20*   BUN 72*   < > 77*   CREATININE 5.79*   < > 6.42*   CALCIUM 7.2*   < > 7.4*   MG 1.8  --   --     < > = values in this interval not displayed.     CMP:   Recent Labs   Lab 04/24/22  0706 04/24/22  1516 04/25/22  0302 04/26/22  0336   GLU 85   < > 72* 78   CALCIUM 7.2*   < > 7.4* 7.4*   ALBUMIN 3.4*  --  3.3*  --    PROT 6.5  --   --   --       < > 140 138   K 6.1*   < > 5.7* 6.0*   CO2 18*   < > 18* 20*      < > 107 109*   BUN 72*   < > 78* 77*   CREATININE 5.79*   < > 6.04* 6.42*   ALKPHOS 60  --   --   --    ALT 28  --   --   --    AST 16  --   --   --    BILITOT 0.4  --   --   --     < > = values in this interval not displayed.     LFTs:   Recent Labs   Lab 04/24/22  0706 04/25/22  0302   ALT 28  --    AST 16  --    ALKPHOS 60  --    BILITOT 0.4  --    PROT 6.5  --    ALBUMIN 3.4* 3.3*       Significant Diagnostics:  I have reviewed all pertinent imaging results/findings within the past 24 hours.      Assessment/Plan:     * Acute kidney injury superimposed on chronic kidney disease  04/26 NPO after midnight for tunneled HD catheter per dr draper  Discussed with Dr. Ju Draper Explained risks bleeding infection,  possible PTX  Vein mapping to evaluate for AVF vs AVG can be done outpatient        VTE Risk Mitigation (From admission, onward)    None          Thank you for your consult. I will follow-up with patient. Please contact us if you have any additional questions.    Citlali Blakely, ACNP  General Surgery  [facility] - [address] [place] [address]

## 2022-04-26 NOTE — SUBJECTIVE & OBJECTIVE
Interval History:  Denies chest pain or SOB.  Appreciate nephrology consultation.     Review of Systems   Respiratory:  Negative for shortness of breath.    Cardiovascular:  Negative for chest pain.   Genitourinary:  Negative for dysuria.   Objective:     Vital Signs (Most Recent):  Temp: 97.7 °F (36.5 °C) (04/25/22 1937)  Pulse: 72 (04/25/22 1937)  Resp: 18 (04/25/22 1937)  BP: 118/80 (04/25/22 1937)  SpO2: 96 % (04/25/22 1937) Vital Signs (24h Range):  Temp:  [97.3 °F (36.3 °C)-98.2 °F (36.8 °C)] 97.7 °F (36.5 °C)  Pulse:  [72-81] 72  Resp:  [18] 18  SpO2:  [96 %-99 %] 96 %  BP: (113-133)/(78-92) 118/80     Weight: 107.3 kg (236 lb 8.9 oz)  Body mass index is 34.93 kg/m².    Intake/Output Summary (Last 24 hours) at 4/25/2022 2059  Last data filed at 4/25/2022 0809  Gross per 24 hour   Intake --   Output 2175 ml   Net -2175 ml      Physical Exam  Vitals reviewed.   Constitutional:       General: He is not in acute distress.     Appearance: He is not toxic-appearing.   Eyes:      General: No scleral icterus.  Cardiovascular:      Rate and Rhythm: Normal rate and regular rhythm.      Heart sounds: Normal heart sounds.   Pulmonary:      Effort: Pulmonary effort is normal. No respiratory distress.      Breath sounds: Normal breath sounds. No wheezing.   Abdominal:      General: Abdomen is flat. Bowel sounds are normal. There is no distension.      Palpations: Abdomen is soft.   Musculoskeletal:      Right lower leg: No edema.      Left lower leg: No edema.   Skin:     General: Skin is warm and dry.   Neurological:      Mental Status: He is alert.       Significant Labs: All pertinent labs within the past 24 hours have been reviewed.  BMP:   Recent Labs   Lab 04/24/22  0706 04/24/22  1516 04/25/22  0302   GLU 85   < > 72*      < > 140   K 6.1*   < > 5.7*      < > 107   CO2 18*   < > 18*   BUN 72*   < > 78*   CREATININE 5.79*   < > 6.04*   CALCIUM 7.2*   < > 7.4*   MG 1.8  --   --     < > = values in this  interval not displayed.     CBC:   Recent Labs   Lab 04/25/22  0302   WBC 4.99   HGB 11.0*   HCT 36.8*          Significant Imaging: I have reviewed all pertinent imaging results/findings within the past 24 hours.

## 2022-04-26 NOTE — ASSESSMENT & PLAN NOTE
Will start low dose asa.    Check echo  Check carotids  Check ESR  Check lipids.    Neuro checks  Monitor on telemetry      4/25 - carotids ok.

## 2022-04-26 NOTE — PROGRESS NOTES
[facility] [place] - [address] [place] [address] Medicine  Progress Note    Patient Name: Catalina Stephens  MRN: [id number]  Patient Class: IP- Inpatient   Admission Date: 4/24/2022  Length of Stay: 1 days  Attending Physician: Cali Dodd Jr., MD  Primary Care Provider: Ja Hernandez MD        Subjective:     Principal Problem:Acute kidney injury superimposed on chronic kidney disease        HPI:  58 yo M presents to [facility] ED with right arm tingling for one hour prior to coming to the ED.  Patient says he wasjust sitting in his recliner and his wife became concerned and took him to the ED.  Patient had a stat head CT which showed proptosis and left frontal sinusitis.  He has no HA or vision change nor does he c/o sinus pressure.      His labs showed a K of 7 along with worsening renal function with BUN 73 and creat of 6.  Patient states he sees Dr. Cas Guardado for PCKD and that his father had PCKD as well.  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 Montaño after a C which did not show any obstructing CAD.  Patient states that he has not had any change in UOP but admits he has not followed with Dr. Guardado in quite some time.      Patient was given a Delmer's cocktail for hyperkalemia and EKG did not have peaked Twaves and initial troponin was normal.  Repeat K was 5.  CXR did show cardiomegally but no infiltrates.        Overview/Hospital Course:  No notes on file    Interval History:  Denies chest pain or SOB.  Appreciate nephrology consultation.     Review of Systems   Respiratory:  Negative for shortness of breath.    Cardiovascular:  Negative for chest pain.   Genitourinary:  Negative for dysuria.   Objective:     Vital Signs (Most Recent):  Temp: 97.7 °F (36.5 °C) (04/25/22 1937)  Pulse: 72 (04/25/22 1937)  Resp: 18 (04/25/22 1937)  BP: 118/80 (04/25/22 1937)  SpO2: 96 % (04/25/22 1937) Vital  Signs (24h Range):  Temp:  [97.3 °F (36.3 °C)-98.2 °F (36.8 °C)] 97.7 °F (36.5 °C)  Pulse:  [72-81] 72  Resp:  [18] 18  SpO2:  [96 %-99 %] 96 %  BP: (113-133)/(78-92) 118/80     Weight: 107.3 kg (236 lb 8.9 oz)  Body mass index is 34.93 kg/m².    Intake/Output Summary (Last 24 hours) at 4/25/2022 2059  Last data filed at 4/25/2022 0809  Gross per 24 hour   Intake --   Output 2175 ml   Net -2175 ml      Physical Exam  Vitals reviewed.   Constitutional:       General: He is not in acute distress.     Appearance: He is not toxic-appearing.   Eyes:      General: No scleral icterus.  Cardiovascular:      Rate and Rhythm: Normal rate and regular rhythm.      Heart sounds: Normal heart sounds.   Pulmonary:      Effort: Pulmonary effort is normal. No respiratory distress.      Breath sounds: Normal breath sounds. No wheezing.   Abdominal:      General: Abdomen is flat. Bowel sounds are normal. There is no distension.      Palpations: Abdomen is soft.   Musculoskeletal:      Right lower leg: No edema.      Left lower leg: No edema.   Skin:     General: Skin is warm and dry.   Neurological:      Mental Status: He is alert.       Significant Labs: All pertinent labs within the past 24 hours have been reviewed.  BMP:   Recent Labs   Lab 04/24/22  0706 04/24/22  1516 04/25/22  0302   GLU 85   < > 72*      < > 140   K 6.1*   < > 5.7*      < > 107   CO2 18*   < > 18*   BUN 72*   < > 78*   CREATININE 5.79*   < > 6.04*   CALCIUM 7.2*   < > 7.4*   MG 1.8  --   --     < > = values in this interval not displayed.     CBC:   Recent Labs   Lab 04/25/22  0302   WBC 4.99   HGB 11.0*   HCT 36.8*          Significant Imaging: I have reviewed all pertinent imaging results/findings within the past 24 hours.      Assessment/Plan:      * Acute kidney injury superimposed on chronic kidney disease  Gentle hydration  Stop diuretic  Continue BB but no nephrotoxic agents  Renal US  Will give 1 g mag and recheck all labs  Monitor on  telemetry  Appreciate nephrology assistance.      4/25 - no improvement in creatinine. He is making urine.       Hyperkalemia  Stop aldactone  Recheck k now down to 5.3 after Delmer's cocktail.     4/25 - back to 5.7. Will give dose of kayexalate.       TIA (transient ischemic attack)  Will start low dose asa.    Check echo  Check carotids  Check ESR  Check lipids.    Neuro checks  Monitor on telemetry      4/25 - carotids ok.       VTE Risk Mitigation (From admission, onward)    None          Discharge Planning   JEY:      Code Status: Full Code   Is the patient medically ready for discharge?:     Reason for patient still in hospital (select all that apply): Treatment                     Cali Dodd Jr, MD  Department of Hospital Medicine   [facility] - [address] [place] [address]

## 2022-04-26 NOTE — NURSING
Spoke with Shagufta Blakely NP regarding possible HD cath placement as asked by Dr. Dodd.  No formal consult written yet.  Patient has eaten lunch.  Shagufta Ledezma aware.  Surgery will see him later today.

## 2022-04-26 NOTE — NURSING
Dr. Draper and Shagufta Blakely NP in to see patient.  Explained HD cath procedure to patient and he verbalized understanding and signed consent (witnessed by Shagufta Ledezma NP.      Patient instructed not to eat or drink anything after midnight tonight.  Verbalized understanding.

## 2022-04-26 NOTE — SUBJECTIVE & OBJECTIVE
No current facility-administered medications on file prior to encounter.     Current Outpatient Medications on File Prior to Encounter   Medication Sig    albuterol (PROVENTIL/VENTOLIN HFA) 90 mcg/actuation inhaler Inhale 2 puffs into the lungs every 6 (six) hours as needed for Wheezing. Rescue    carvediloL (COREG) 12.5 MG tablet Take 1 tablet (12.5 mg total) by mouth 2 (two) times daily.    spironolactone (ALDACTONE) 25 MG tablet Take 1 tablet (25 mg total) by mouth once daily.       Review of patient's allergies indicates:   Allergen Reactions    Ace inhibitors Swelling    Iodine and iodide containing products Nausea And Vomiting     SHRIMP       Past Medical History:   Diagnosis Date    CHF (congestive heart failure)     Chronic systolic (congestive) heart failure     Hypertension     Mild intermittent asthma, uncomplicated     Polycystic kidney disease      Past Surgical History:   Procedure Laterality Date    LEFT HEART CATHETERIZATION       Family History       Problem Relation (Age of Onset)    Heart attack Father    Heart disease Father    Kidney disease Father    No Known Problems Mother    Stroke Maternal Grandfather          Tobacco Use    Smoking status: Never Smoker    Smokeless tobacco: Never Used   Substance and Sexual Activity    Alcohol use: Not Currently    Drug use: Never    Sexual activity: Yes     Partners: Female     Birth control/protection: None     Review of Systems  Objective:     Vital Signs (Most Recent):  Temp: 97.5 °F (36.4 °C) (04/26/22 1100)  Pulse: 85 (04/26/22 1100)  Resp: 18 (04/26/22 1100)  BP: 116/74 (04/26/22 1100)  SpO2: 98 % (04/26/22 1100) Vital Signs (24h Range):  Temp:  [97.5 °F (36.4 °C)-98.3 °F (36.8 °C)] 97.5 °F (36.4 °C)  Pulse:  [72-86] 85  Resp:  [18-20] 18  SpO2:  [96 %-99 %] 98 %  BP: (116-130)/(74-85) 116/74     Weight: 105.4 kg (232 lb 5.8 oz)  Body mass index is 34.31 kg/m².    Physical Exam  Vitals and nursing note reviewed. Exam conducted with a chaperone  present.   HENT:      Head: Normocephalic.      Nose: Nose normal.   Eyes:      Conjunctiva/sclera: Conjunctivae normal.   Cardiovascular:      Rate and Rhythm: Normal rate.   Pulmonary:      Effort: Pulmonary effort is normal.   Abdominal:      General: Abdomen is flat.      Palpations: Abdomen is soft.   Skin:     General: Skin is warm and dry.      Capillary Refill: Capillary refill takes less than 2 seconds.   Neurological:      Mental Status: He is alert and oriented to person, place, and time.       Significant Labs:  I have reviewed all pertinent lab results within the past 24 hours.  CBC:   Recent Labs   Lab 04/25/22 0302   WBC 4.99   RBC 3.99*   HGB 11.0*   HCT 36.8*      MCV 92.2   MCH 27.6   MCHC 29.9*     BMP:   Recent Labs   Lab 04/24/22 0706 04/24/22 1516 04/26/22 0336   GLU 85   < > 78      < > 138   K 6.1*   < > 6.0*      < > 109*   CO2 18*   < > 20*   BUN 72*   < > 77*   CREATININE 5.79*   < > 6.42*   CALCIUM 7.2*   < > 7.4*   MG 1.8  --   --     < > = values in this interval not displayed.     CMP:   Recent Labs   Lab 04/24/22 0706 04/24/22 1516 04/25/22 0302 04/26/22 0336   GLU 85   < > 72* 78   CALCIUM 7.2*   < > 7.4* 7.4*   ALBUMIN 3.4*  --  3.3*  --    PROT 6.5  --   --   --       < > 140 138   K 6.1*   < > 5.7* 6.0*   CO2 18*   < > 18* 20*      < > 107 109*   BUN 72*   < > 78* 77*   CREATININE 5.79*   < > 6.04* 6.42*   ALKPHOS 60  --   --   --    ALT 28  --   --   --    AST 16  --   --   --    BILITOT 0.4  --   --   --     < > = values in this interval not displayed.     LFTs:   Recent Labs   Lab 04/24/22 0706 04/25/22 0302   ALT 28  --    AST 16  --    ALKPHOS 60  --    BILITOT 0.4  --    PROT 6.5  --    ALBUMIN 3.4* 3.3*       Significant Diagnostics:  I have reviewed all pertinent imaging results/findings within the past 24 hours.

## 2022-04-26 NOTE — HPI
58 y/o male with autosomal polycystic disease acute on chronic kidney disease  Admitted with hyperkalemia, worsening renal function  Potassium is 6.0 has had 30 gram of kayexelate recheck K pending  Patient has been trying to decide between hemodialysis/peritoneal dialysis  Patient has agreed to hemodialysis  General Surgery consulted for hemodialysis catheter, patient not NPO  PMH:  asthma, cardiomyopathy

## 2022-04-26 NOTE — NURSING
Attempted to call Dr. Real regarding whether or not he wants to proceed with PD or HD cath so patient can be daily.

## 2022-04-26 NOTE — H&P (VIEW-ONLY)
[address]  General Surgery  Consult Note    Patient Name: Catalina Stephens  MRN: [id number]  Code Status: Full Code  Admission Date: 4/24/2022  Hospital Length of Stay: 2 days  Attending Physician: Cali Dodd Jr., MD  Primary Care Provider: Ja Hernandez MD    Patient information was obtained from patient and ER records.     Inpatient consult to General Surgery  Consult performed by: MIGUEL Tan  Consult ordered by: Cali Dodd Jr., MD  Reason for consult: HD cath  Assessment/Recommendations: Tunneled HD cath in OR per dr geraldine piper        Subjective:     Principal Problem: Acute kidney injury superimposed on chronic kidney disease    History of Present Illness: 56 y/o male with autosomal polycystic disease acute on chronic kidney disease  Admitted with hyperkalemia, worsening renal function  Potassium is 6.0 has had 30 gram of kayexelate recheck K pending  Patient has been trying to decide between hemodialysis/peritoneal dialysis  Patient has agreed to hemodialysis  General Surgery consulted for hemodialysis catheter, patient not NPO  PMH:  asthma, cardiomyopathy      No current facility-administered medications on file prior to encounter.     Current Outpatient Medications on File Prior to Encounter   Medication Sig    albuterol (PROVENTIL/VENTOLIN HFA) 90 mcg/actuation inhaler Inhale 2 puffs into the lungs every 6 (six) hours as needed for Wheezing. Rescue    carvediloL (COREG) 12.5 MG tablet Take 1 tablet (12.5 mg total) by mouth 2 (two) times daily.    spironolactone (ALDACTONE) 25 MG tablet Take 1 tablet (25 mg total) by mouth once daily.       Review of patient's allergies indicates:   Allergen Reactions    Ace inhibitors Swelling    Iodine and iodide containing products Nausea And Vomiting     SHRIMP       Past Medical History:   Diagnosis Date    CHF (congestive heart failure)     Chronic systolic (congestive) heart failure     Hypertension      Mild intermittent asthma, uncomplicated     Polycystic kidney disease      Past Surgical History:   Procedure Laterality Date    LEFT HEART CATHETERIZATION       Family History       Problem Relation (Age of Onset)    Heart attack Father    Heart disease Father    Kidney disease Father    No Known Problems Mother    Stroke Maternal Grandfather          Tobacco Use    Smoking status: Never Smoker    Smokeless tobacco: Never Used   Substance and Sexual Activity    Alcohol use: Not Currently    Drug use: Never    Sexual activity: Yes     Partners: Female     Birth control/protection: None     Review of Systems  Objective:     Vital Signs (Most Recent):  Temp: 97.5 °F (36.4 °C) (04/26/22 1100)  Pulse: 85 (04/26/22 1100)  Resp: 18 (04/26/22 1100)  BP: 116/74 (04/26/22 1100)  SpO2: 98 % (04/26/22 1100) Vital Signs (24h Range):  Temp:  [97.5 °F (36.4 °C)-98.3 °F (36.8 °C)] 97.5 °F (36.4 °C)  Pulse:  [72-86] 85  Resp:  [18-20] 18  SpO2:  [96 %-99 %] 98 %  BP: (116-130)/(74-85) 116/74     Weight: 105.4 kg (232 lb 5.8 oz)  Body mass index is 34.31 kg/m².    Physical Exam  Vitals and nursing note reviewed. Exam conducted with a chaperone present.   HENT:      Head: Normocephalic.      Nose: Nose normal.   Eyes:      Conjunctiva/sclera: Conjunctivae normal.   Cardiovascular:      Rate and Rhythm: Normal rate.   Pulmonary:      Effort: Pulmonary effort is normal.   Abdominal:      General: Abdomen is flat.      Palpations: Abdomen is soft.   Skin:     General: Skin is warm and dry.      Capillary Refill: Capillary refill takes less than 2 seconds.   Neurological:      Mental Status: He is alert and oriented to person, place, and time.       Significant Labs:  I have reviewed all pertinent lab results within the past 24 hours.  CBC:   Recent Labs   Lab 04/25/22  0302   WBC 4.99   RBC 3.99*   HGB 11.0*   HCT 36.8*      MCV 92.2   MCH 27.6   MCHC 29.9*     BMP:   Recent Labs   Lab 04/24/22  0706 04/24/22  1516  04/26/22  0336   GLU 85   < > 78      < > 138   K 6.1*   < > 6.0*      < > 109*   CO2 18*   < > 20*   BUN 72*   < > 77*   CREATININE 5.79*   < > 6.42*   CALCIUM 7.2*   < > 7.4*   MG 1.8  --   --     < > = values in this interval not displayed.     CMP:   Recent Labs   Lab 04/24/22  0706 04/24/22  1516 04/25/22  0302 04/26/22  0336   GLU 85   < > 72* 78   CALCIUM 7.2*   < > 7.4* 7.4*   ALBUMIN 3.4*  --  3.3*  --    PROT 6.5  --   --   --       < > 140 138   K 6.1*   < > 5.7* 6.0*   CO2 18*   < > 18* 20*      < > 107 109*   BUN 72*   < > 78* 77*   CREATININE 5.79*   < > 6.04* 6.42*   ALKPHOS 60  --   --   --    ALT 28  --   --   --    AST 16  --   --   --    BILITOT 0.4  --   --   --     < > = values in this interval not displayed.     LFTs:   Recent Labs   Lab 04/24/22  0706 04/25/22  0302   ALT 28  --    AST 16  --    ALKPHOS 60  --    BILITOT 0.4  --    PROT 6.5  --    ALBUMIN 3.4* 3.3*       Significant Diagnostics:  I have reviewed all pertinent imaging results/findings within the past 24 hours.      Assessment/Plan:     * Acute kidney injury superimposed on chronic kidney disease  04/26 NPO after midnight for tunneled HD catheter per dr draper  Discussed with Dr. Ju Draper Explained risks bleeding infection,  possible PTX  Vein mapping to evaluate for AVF vs AVG can be done outpatient        VTE Risk Mitigation (From admission, onward)    None          Thank you for your consult. I will follow-up with patient. Please contact us if you have any additional questions.    Citlali Blakely, ACNP  General Surgery  [facility] - [address] [place] [address]

## 2022-04-26 NOTE — ASSESSMENT & PLAN NOTE
04/26 NPO after midnight for tunneled HD catheter per dr draper  Discussed with Dr. Ju Draper Explained risks bleeding infection,  possible PTX  Vein mapping to evaluate for AVF vs AVG can be done outpatient

## 2022-04-27 ENCOUNTER — ANESTHESIA (OUTPATIENT)
Dept: SURGERY | Facility: HOSPITAL | Age: 57
DRG: 674 | End: 2022-04-27
Payer: COMMERCIAL

## 2022-04-27 ENCOUNTER — ANESTHESIA EVENT (OUTPATIENT)
Dept: SURGERY | Facility: HOSPITAL | Age: 57
DRG: 674 | End: 2022-04-27
Payer: COMMERCIAL

## 2022-04-27 LAB
HAV IGM SER QL: NORMAL
HBV CORE IGM SER QL: NORMAL
HBV SURFACE AG SERPL QL IA: NORMAL
HCV AB SER QL: NORMAL

## 2022-04-27 PROCEDURE — 25000003 PHARM REV CODE 250: Performed by: INTERNAL MEDICINE

## 2022-04-27 PROCEDURE — 36558 PR INSERT TUNNELED CV CATH W/O PORT OR PUMP: ICD-10-PCS | Mod: RT,,, | Performed by: SURGERY

## 2022-04-27 PROCEDURE — 25000003 PHARM REV CODE 250: Performed by: HOSPITALIST

## 2022-04-27 PROCEDURE — 63600175 PHARM REV CODE 636 W HCPCS: Performed by: SURGERY

## 2022-04-27 PROCEDURE — 27000510 HC BLANKET BAIR HUGGER ANY SIZE: Performed by: ANESTHESIOLOGY

## 2022-04-27 PROCEDURE — 36000706: Performed by: SURGERY

## 2022-04-27 PROCEDURE — 27000716 HC OXISENSOR PROBE, ANY SIZE: Performed by: ANESTHESIOLOGY

## 2022-04-27 PROCEDURE — D9220A PRA ANESTHESIA: Mod: ANES,,, | Performed by: ANESTHESIOLOGY

## 2022-04-27 PROCEDURE — 99232 SBSQ HOSP IP/OBS MODERATE 35: CPT | Mod: ,,, | Performed by: FAMILY MEDICINE

## 2022-04-27 PROCEDURE — 36415 COLL VENOUS BLD VENIPUNCTURE: CPT

## 2022-04-27 PROCEDURE — D9220A PRA ANESTHESIA: Mod: CRNA,,, | Performed by: NURSE ANESTHETIST, CERTIFIED REGISTERED

## 2022-04-27 PROCEDURE — 25000003 PHARM REV CODE 250: Performed by: SURGERY

## 2022-04-27 PROCEDURE — 63600175 PHARM REV CODE 636 W HCPCS: Performed by: INTERNAL MEDICINE

## 2022-04-27 PROCEDURE — 36000707: Performed by: SURGERY

## 2022-04-27 PROCEDURE — 36558 INSERT TUNNELED CV CATH: CPT | Mod: RT,,, | Performed by: SURGERY

## 2022-04-27 PROCEDURE — 77001 CHG FLUOROGUIDE CNTRL VEN ACCESS,PLACE,REPLACE,REMOVE: ICD-10-PCS | Mod: 26,,, | Performed by: SURGERY

## 2022-04-27 PROCEDURE — 27201423 OPTIME MED/SURG SUP & DEVICES STERILE SUPPLY: Performed by: SURGERY

## 2022-04-27 PROCEDURE — 11000001 HC ACUTE MED/SURG PRIVATE ROOM

## 2022-04-27 PROCEDURE — D9220A PRA ANESTHESIA: ICD-10-PCS | Mod: ANES,,, | Performed by: ANESTHESIOLOGY

## 2022-04-27 PROCEDURE — 99232 PR SUBSEQUENT HOSPITAL CARE,LEVL II: ICD-10-PCS | Mod: ,,, | Performed by: FAMILY MEDICINE

## 2022-04-27 PROCEDURE — 37000008 HC ANESTHESIA 1ST 15 MINUTES: Performed by: SURGERY

## 2022-04-27 PROCEDURE — 71000033 HC RECOVERY, INTIAL HOUR: Performed by: SURGERY

## 2022-04-27 PROCEDURE — 25000003 PHARM REV CODE 250: Performed by: NURSE ANESTHETIST, CERTIFIED REGISTERED

## 2022-04-27 PROCEDURE — 80074 ACUTE HEPATITIS PANEL: CPT | Performed by: FAMILY MEDICINE

## 2022-04-27 PROCEDURE — C1750 CATH, HEMODIALYSIS,LONG-TERM: HCPCS | Performed by: SURGERY

## 2022-04-27 PROCEDURE — D9220A PRA ANESTHESIA: ICD-10-PCS | Mod: CRNA,,, | Performed by: NURSE ANESTHETIST, CERTIFIED REGISTERED

## 2022-04-27 PROCEDURE — 27000177 HC AIRWAY, LARYNGEAL MASK: Performed by: ANESTHESIOLOGY

## 2022-04-27 PROCEDURE — 63600175 PHARM REV CODE 636 W HCPCS: Performed by: NURSE ANESTHETIST, CERTIFIED REGISTERED

## 2022-04-27 PROCEDURE — 77001 FLUOROGUIDE FOR VEIN DEVICE: CPT | Mod: 26,,, | Performed by: SURGERY

## 2022-04-27 PROCEDURE — 37000009 HC ANESTHESIA EA ADD 15 MINS: Performed by: SURGERY

## 2022-04-27 PROCEDURE — 90935 HEMODIALYSIS ONE EVALUATION: CPT

## 2022-04-27 RX ORDER — ONDANSETRON 2 MG/ML
4 INJECTION INTRAMUSCULAR; INTRAVENOUS DAILY PRN
Status: DISCONTINUED | OUTPATIENT
Start: 2022-04-27 | End: 2022-04-27 | Stop reason: HOSPADM

## 2022-04-27 RX ORDER — MEPERIDINE HYDROCHLORIDE 25 MG/ML
25 INJECTION INTRAMUSCULAR; INTRAVENOUS; SUBCUTANEOUS EVERY 10 MIN PRN
Status: DISCONTINUED | OUTPATIENT
Start: 2022-04-27 | End: 2022-04-27 | Stop reason: HOSPADM

## 2022-04-27 RX ORDER — HEPARIN SODIUM 1000 [USP'U]/ML
INJECTION, SOLUTION INTRAVENOUS; SUBCUTANEOUS
Status: DISCONTINUED | OUTPATIENT
Start: 2022-04-27 | End: 2022-04-27 | Stop reason: HOSPADM

## 2022-04-27 RX ORDER — CEFAZOLIN SODIUM 1 G/3ML
INJECTION, POWDER, FOR SOLUTION INTRAMUSCULAR; INTRAVENOUS
Status: DISCONTINUED | OUTPATIENT
Start: 2022-04-27 | End: 2022-04-27

## 2022-04-27 RX ORDER — SODIUM CHLORIDE 9 MG/ML
INJECTION, SOLUTION INTRAVENOUS
Status: DISPENSED
Start: 2022-04-27 | End: 2022-04-28

## 2022-04-27 RX ORDER — HYDROMORPHONE HYDROCHLORIDE 2 MG/ML
0.5 INJECTION, SOLUTION INTRAMUSCULAR; INTRAVENOUS; SUBCUTANEOUS EVERY 5 MIN PRN
Status: DISCONTINUED | OUTPATIENT
Start: 2022-04-27 | End: 2022-04-27 | Stop reason: HOSPADM

## 2022-04-27 RX ORDER — SODIUM CHLORIDE 9 MG/ML
INJECTION, SOLUTION INTRAVENOUS
Status: DISCONTINUED | OUTPATIENT
Start: 2022-04-27 | End: 2022-05-02 | Stop reason: HOSPADM

## 2022-04-27 RX ORDER — LIDOCAINE HYDROCHLORIDE 10 MG/ML
INJECTION INFILTRATION; PERINEURAL
Status: DISCONTINUED | OUTPATIENT
Start: 2022-04-27 | End: 2022-04-27 | Stop reason: HOSPADM

## 2022-04-27 RX ORDER — PHENYLEPHRINE HYDROCHLORIDE 10 MG/ML
INJECTION INTRAVENOUS
Status: DISCONTINUED | OUTPATIENT
Start: 2022-04-27 | End: 2022-04-27

## 2022-04-27 RX ORDER — LIDOCAINE HYDROCHLORIDE 20 MG/ML
INJECTION, SOLUTION EPIDURAL; INFILTRATION; INTRACAUDAL; PERINEURAL
Status: DISCONTINUED | OUTPATIENT
Start: 2022-04-27 | End: 2022-04-27

## 2022-04-27 RX ORDER — MORPHINE SULFATE 10 MG/ML
4 INJECTION INTRAMUSCULAR; INTRAVENOUS; SUBCUTANEOUS EVERY 5 MIN PRN
Status: DISCONTINUED | OUTPATIENT
Start: 2022-04-27 | End: 2022-04-27 | Stop reason: HOSPADM

## 2022-04-27 RX ORDER — DIPHENHYDRAMINE HYDROCHLORIDE 50 MG/ML
25 INJECTION INTRAMUSCULAR; INTRAVENOUS EVERY 6 HOURS PRN
Status: DISCONTINUED | OUTPATIENT
Start: 2022-04-27 | End: 2022-04-27 | Stop reason: HOSPADM

## 2022-04-27 RX ORDER — HYDROCODONE BITARTRATE AND ACETAMINOPHEN 7.5; 325 MG/1; MG/1
1 TABLET ORAL EVERY 6 HOURS PRN
Status: DISCONTINUED | OUTPATIENT
Start: 2022-04-27 | End: 2022-05-02 | Stop reason: HOSPADM

## 2022-04-27 RX ORDER — HEPARIN SODIUM 1000 [USP'U]/ML
4000 INJECTION, SOLUTION INTRAVENOUS; SUBCUTANEOUS
Status: DISCONTINUED | OUTPATIENT
Start: 2022-04-27 | End: 2022-05-02 | Stop reason: HOSPADM

## 2022-04-27 RX ORDER — PROPOFOL 10 MG/ML
VIAL (ML) INTRAVENOUS
Status: DISCONTINUED | OUTPATIENT
Start: 2022-04-27 | End: 2022-04-27

## 2022-04-27 RX ORDER — SODIUM CHLORIDE 9 MG/ML
INJECTION, SOLUTION INTRAVENOUS CONTINUOUS PRN
Status: DISCONTINUED | OUTPATIENT
Start: 2022-04-27 | End: 2022-04-27

## 2022-04-27 RX ADMIN — PHENYLEPHRINE HYDROCHLORIDE 200 MCG: 10 INJECTION INTRAVENOUS at 07:04

## 2022-04-27 RX ADMIN — SEVELAMER CARBONATE 800 MG: 800 TABLET, FILM COATED ORAL at 05:04

## 2022-04-27 RX ADMIN — SODIUM BICARBONATE 650 MG: 650 TABLET ORAL at 12:04

## 2022-04-27 RX ADMIN — ASPIRIN 81 MG: 81 TABLET, COATED ORAL at 12:04

## 2022-04-27 RX ADMIN — CEFAZOLIN 2 G: 1 INJECTION, POWDER, FOR SOLUTION INTRAMUSCULAR; INTRAVENOUS; PARENTERAL at 07:04

## 2022-04-27 RX ADMIN — CARVEDILOL 12.5 MG: 12.5 TABLET, FILM COATED ORAL at 12:04

## 2022-04-27 RX ADMIN — CARVEDILOL 12.5 MG: 12.5 TABLET, FILM COATED ORAL at 09:04

## 2022-04-27 RX ADMIN — SODIUM BICARBONATE 650 MG: 650 TABLET ORAL at 09:04

## 2022-04-27 RX ADMIN — SODIUM CHLORIDE: 9 INJECTION, SOLUTION INTRAVENOUS at 07:04

## 2022-04-27 RX ADMIN — PROPOFOL 150 MG: 10 INJECTION, EMULSION INTRAVENOUS at 07:04

## 2022-04-27 RX ADMIN — HEPARIN SODIUM 4000 UNITS: 1000 INJECTION INTRAVENOUS; SUBCUTANEOUS at 04:04

## 2022-04-27 RX ADMIN — LIDOCAINE HYDROCHLORIDE 100 MG: 20 INJECTION, SOLUTION INTRAVENOUS at 07:04

## 2022-04-27 RX ADMIN — SEVELAMER CARBONATE 800 MG: 800 TABLET, FILM COATED ORAL at 12:04

## 2022-04-27 NOTE — PROGRESS NOTES
REC'ED TO RR EASILY AROUSED. ORIENTATION GIVEN. NO C/O PAIN. DIALYSIS CATH INTACT RIGHT UPPER CHEST, TRANSPARENT DRESSING D/I. IV INFUSING WELL LEFT HAND 20G. CATH. NO DISTRESS NOTED SEE FLOW SHEET.

## 2022-04-27 NOTE — PLAN OF CARE
Problem: Adult Inpatient Plan of Care  Goal: Plan of Care Review  Outcome: Ongoing, Progressing  Goal: Patient-Specific Goal (Individualized)  Outcome: Ongoing, Progressing  Goal: Absence of Hospital-Acquired Illness or Injury  Outcome: Ongoing, Progressing  Goal: Optimal Comfort and Wellbeing  Outcome: Ongoing, Progressing  Goal: Readiness for Transition of Care  Outcome: Ongoing, Progressing     Problem: Fall Injury Risk  Goal: Absence of Fall and Fall-Related Injury  Outcome: Ongoing, Progressing     Problem: Fluid and Electrolyte Imbalance (Acute Kidney Injury/Impairment)  Goal: Fluid and Electrolyte Balance  Outcome: Ongoing, Progressing     Problem: Oral Intake Inadequate (Acute Kidney Injury/Impairment)  Goal: Optimal Nutrition Intake  Outcome: Ongoing, Progressing     Problem: Renal Function Impairment (Acute Kidney Injury/Impairment)  Goal: Effective Renal Function  Outcome: Ongoing, Progressing     Problem: Device-Related Complication Risk (Hemodialysis)  Goal: Safe, Effective Therapy Delivery  Outcome: Ongoing, Progressing     Problem: Hemodynamic Instability (Hemodialysis)  Goal: Effective Tissue Perfusion  Outcome: Ongoing, Progressing     Problem: Infection (Hemodialysis)  Goal: Absence of Infection Signs and Symptoms  Outcome: Ongoing, Progressing     Problem: Infection  Goal: Absence of Infection Signs and Symptoms  Outcome: Ongoing, Progressing

## 2022-04-27 NOTE — OP NOTE
[facility] [place] - Periop Services     Operative Note    SUMMARY     Date of Procedure: 4/27/2022     Procedure: Procedure(s) (LRB):  Insertion,catheter,tunneled (Right)     Surgeon(s) and Role:     * Zachary Draper MD - Primary    Assisting Surgeon: None    Pre-Operative Diagnosis: Acute kidney injury superimposed on chronic kidney disease [N17.9, N18.9]  Hyperkalemia [E87.5]    Post-Operative Diagnosis: Post-Op Diagnosis Codes:     * Acute kidney injury superimposed on chronic kidney disease [N17.9, N18.9]     * Hyperkalemia [E87.5]    Anesthesia: General/MAC    Technical Procedures Used: The patient was brought to the operating room and placed in supine position. An LMA was placed, and IV sedation was administered by anesthesia staff. The U/S was used to confirm patent right internal jugular vein.  The right neck was prepped and draped in standard fashion to include an Ioban. Lidocaine was injected at the site of the planned venipuncture and over the chest and along the path to tunnel the catheter. Cook needle was then used to aspirate venous blood from the right IJ under direct visualization by ultrasound. A guidewire was then passed, and fluoroscopy was used to confirm passage into the superior vena cava. A stab incision was then performed along the guidewire and along the right chest, the catheter was tunneled from the chest incision to the guidewire. Following this, dilators were then passed over the guidewire under fluoroscopy, confirming coaxial placement of the dilators. Subsequent to this the peel-away sheath was placed, and the guidewire and large dilator were removed. The tunneled dialysis catheter was then passed through the peel-away sheath. Once the sheath was removed, catheter was noted to be in good position. Fluoroscopy was used to adjust catheter to the appropriate length. Catheter was then secured to the skin with nylon. The guidewire site was closed using interrupted Vicryl. A steristrip was  also placed. The patient was awakened from anesthesia in stable condition.    Description of the Findings of the Procedure:  Widely patent internal jugular vein.  Ultrasound used to observe vena puncture and confirmed patent IJ vein.  Fluoroscopy used to ensure appropriate catheter position in length.    Assistant(s): NILS Robles    Complications: No    Estimated Blood Loss (EBL): 5 mL           Implants: * No implants in log *    Specimens:   Specimen (24h ago, onward)            None           Condition: Good      Complications:  None

## 2022-04-27 NOTE — ANESTHESIA PREPROCEDURE EVALUATION
"                                                                                                             04/27/2022  Catalina Stephens is a 57 y.o., male.      Pre-op Assessment    I have reviewed the Patient Summary Reports.    I have reviewed the NPO Status.   I have reviewed the Medications.     Review of Systems         Anesthesia Plan  Type of Anesthesia, risks & benefits discussed:    Anesthesia Type: Gen Supraglottic Airway  Intra-op Monitoring Plan: Standard ASA Monitors  Post Op Pain Control Plan: IV/PO Opioids PRN  Induction:  IV  Informed Consent: Informed consent signed with the Patient and all parties understand the risks and agree with anesthesia plan.  All questions answered.   ASA Score: 3    Ready For Surgery From Anesthesia Perspective.     .  NPO greater than 8 hours  NAC  Allergies   Ace Inhibitors Swelling High  1/10/2022    Iodine And Iodide Containing Products Nausea And Vomiting High  1/10/2022      Hct 37  K 5.8  Cr 6.4  Ca 7.4  4/23/22 EKG: Sinus rhythm with Fusion complexes   Possible Anterior infarct ,age undetermined; 87 bpm  4/24/22 Echo: mild AR; mild MR; EF 25 - 30%  4/23/22 CXR: "Cardiomegaly.  No dinh pulmonary edema or focal consolidating pneumonia."    Hypertension Mild intermittent asthma, uncomplicated   CHF (congestive heart failure) Polycystic kidney disease   H/o cardiomyopathy  Polycystic kidney disease  H/o TIA  Acute kidney injury superimposed upon CKD    Airway exam deferred (COVID precautions); adequate ROM at neck.      "

## 2022-04-27 NOTE — PLAN OF CARE
SS rec'd consult for dialysis. [facility]. SS following.

## 2022-04-27 NOTE — DIALYSIS ROUNDING
The patient was seen while on hemodialysis today.He had no complaints and he was tolerating the procedure well.He had undergone the placement of a tunnel hemodialysis catheter earlier today  PE  Lungs-clear  Cor-no rub   Abd-soft    Plan:  Hemodialysis tomorrow

## 2022-04-27 NOTE — ASSESSMENT & PLAN NOTE
Gentle hydration  Stop diuretic  Continue BB but no nephrotoxic agents  Renal US  Will give 1 g mag and recheck all labs  Monitor on telemetry  Appreciate nephrology assistance.      4/25 - no improvement in creatinine. He is making urine.     4/26 - K remains an isssue, will retreat with kayexalate. Acidosis controlled with po bicarb.  Creatinine rising.              Consult surgery for tunneled cath tomorrow. Probable HD at Dr. Real's discretion.

## 2022-04-27 NOTE — PROGRESS NOTES
[address]  Nephrology  Progress Note    Patient Name: Catalina Stephens  MRN: [id number]  Admission Date: 4/24/2022  Hospital Length of Stay: 2 days  Attending Provider: Cali Dodd Jr., MD   Primary Care Physician: Ja Hernandez MD  Principal Problem:Acute kidney injury superimposed on chronic kidney disease    Consults  Subjective:     Interval History: The patient has no complaints today.    Review of patient's allergies indicates:   Allergen Reactions    Ace inhibitors Swelling    Iodine and iodide containing products Nausea And Vomiting     SHRIMP     Current Facility-Administered Medications   Medication Frequency    acetaminophen tablet 1,000 mg Q6H PRN    aspirin EC tablet 81 mg Daily    bisacodyL EC tablet 10 mg Daily PRN    carvediloL tablet 12.5 mg BID    dextromethorphan-guaiFENesin  mg/5 ml liquid 10 mL Q6H PRN    ondansetron injection 8 mg Q6H PRN    sevelamer carbonate tablet 800 mg TID WM    simethicone chewable tablet 80 mg TID PRN    sodium bicarbonate tablet 650 mg BID    traZODone tablet 50 mg Nightly PRN       Objective:     Vital Signs (Most Recent):  Temp: 97.5 °F (36.4 °C) (04/26/22 1930)  Pulse: 92 (04/26/22 1930)  Resp: 18 (04/26/22 1930)  BP: (!) 138/91 (04/26/22 1930)  SpO2: 100 % (04/26/22 1930)  O2 Device (Oxygen Therapy): room air (04/26/22 1930) Vital Signs (24h Range):  Temp:  [97.5 °F (36.4 °C)-98.3 °F (36.8 °C)] 97.5 °F (36.4 °C)  Pulse:  [79-92] 92  Resp:  [18-20] 18  SpO2:  [98 %-100 %] 100 %  BP: (116-138)/(74-96) 138/91     Weight: 105.4 kg (232 lb 5.8 oz) (04/26/22 0400)  Body mass index is 34.31 kg/m².  Body surface area is 2.27 meters squared.    I/O last 3 completed shifts:  In: -   Out: 900 [Urine:900]    Physical Exam   Lungs-clear  Cor-no murmur   Abd-soft    Significant Labs:sure  CBC:   Recent Labs   Lab 04/25/22  0302   WBC 4.99   RBC 3.99*   HGB 11.0*   HCT 36.8*      MCV 92.2   MCH 27.6   MCHC 29.9*      CMP:   Recent Labs   Lab 04/24/22  0706 04/24/22  1516 04/25/22  0302 04/26/22  0336   GLU 85   < > 72* 78   CALCIUM 7.2*   < > 7.4* 7.4*   ALBUMIN 3.4*  --  3.3*  --    PROT 6.5  --   --   --       < > 140 138   K 6.1*   < > 5.7* 6.0*   CO2 18*   < > 18* 20*      < > 107 109*   BUN 72*   < > 78* 77*   CREATININE 5.79*   < > 6.04* 6.42*   ALKPHOS 60  --   --   --    ALT 28  --   --   --    AST 16  --   --   --    BILITOT 0.4  --   --   --     < > = values in this interval not displayed.     All labs within the past 24 hours have been reviewed.    Significant Imaging:      Assessment/Plan:     Active Diagnoses:    Diagnosis Date Noted POA    PRINCIPAL PROBLEM:  Acute kidney injury superimposed on chronic kidney disease [N17.9, N18.9] 04/24/2022 Yes    TIA (transient ischemic attack) [G45.9] 04/24/2022 Yes    Hyperkalemia [E87.5] 04/24/2022 Yes      Problems Resolved During this Admission:       Plan:  The patient has decided to proceed with hemodialysis and placement of a tunnel hemodialysis catheter.      Thank you for your consult. I will follow-up with patient. Please contact us if you have any additional questions.    Petr Real MD  Nephrology  [address]

## 2022-04-27 NOTE — PROGRESS NOTES
RELEASED TO FLOOR RN. AWAKE, ALERT. NO C/O PAIN. SCANT RED DRAINAGE AT OP-SITE. NO SWELLING. V/S 128/87-83-16-98%-97.6 TEMP. FAMILY AT BEDSIDE. NO DISTRESS NOTED.

## 2022-04-27 NOTE — ANESTHESIA POSTPROCEDURE EVALUATION
Anesthesia Post Evaluation    Patient: Catalina Stephens    Procedure(s) Performed: Procedure(s) (LRB):  Insertion,catheter,tunneled (Right)    Final Anesthesia Type: general      Patient location during evaluation: PACU  Post-procedure vital signs: reviewed and stable  Pain management: adequate  Airway patency: patent  GORDY mitigation strategies: Extubation and recovery carried out in lateral, semiupright, or other nonsupine position  PONV status at discharge: No PONV  Anesthetic complications: no      Cardiovascular status: hemodynamically stable  Respiratory status: unassisted  Hydration status: euvolemic  Follow-up not needed.          Vitals Value Taken Time   /87 04/27/22 1100   Temp 36.7 °C (98 °F) 04/27/22 1100   Pulse 76 04/27/22 1100   Resp 17 04/27/22 1100   SpO2 99 % 04/27/22 1100         Event Time   Out of Recovery 04/27/2022 09:07:00         Pain/Erum Score: Erum Score: 10 (4/27/2022  9:00 AM)

## 2022-04-27 NOTE — PLAN OF CARE
[facility] - [facility] Telemetry  Initial Discharge Assessment       Primary Care Provider: Ja Hernandez MD    Admission Diagnosis: Renal insufficiency [N28.9]    Admission Date: 4/24/2022  Expected Discharge Date:     Discharge Barriers Identified: None    Payor: [employer] / Plan: BCBS ALL OUT OF STATE / Product Type: PPO /     Extended Emergency Contact Information  Primary Emergency Contact: Arthur Stephens  Home Phone: [phone]  Relation: Spouse  Preferred language: English   needed? No    Discharge Plan A: Home  Discharge Plan B: Home      [facility] - [address]  [address] [facility] [address]  Phone: [phone] Fax: [phone]    [facility]/pharmacy #[id number] [place] [id number]  Phone: [phone] Fax: [phone]      Initial Assessment (most recent)     Adult Discharge Assessment - 04/27/22 1319        Discharge Assessment    Assessment Type Discharge Planning Assessment     Source of Information patient     Lives With spouse     Do you expect to return to your current living situation? Yes     Current cognitive status: Alert/Oriented     Walking or Climbing Stairs Difficulty none     Dressing/Bathing Difficulty none     Equipment Currently Used at Home none     Do you currently have service(s) that help you manage your care at home? No     Who is going to help you get home at discharge? arthur, wife     How do you get to doctors appointments? car, drives self     Are you on dialysis? No   setting up outpatient HD for d/c    Discharge Plan A Home     Discharge Plan B Home     DME Needed Upon Discharge  none     Discharge Plan discussed with: Patient     Discharge Barriers Identified None        Relationship/Environment    Name(s) of Who Lives With Patient arthur, wife                 SS spoke with pt, states he lives at home with wife. Not current with HH and uses no  DME. Plan at d/c to return home. SS to inform pt of dialysis schedule once confirmed. SS following.

## 2022-04-27 NOTE — OR NURSING
CHEST X-RAY DONE PER CAT AHUMADA. WELL.    855 WAITING ON CHEST X-RAY RESULTS. RESTING WITHOUT DISTRESS NOTED.

## 2022-04-27 NOTE — TRANSFER OF CARE
"Anesthesia Transfer of Care Note    Patient: Catalina Stephens    Procedure(s) Performed: Procedure(s) (LRB):  Insertion,catheter,tunneled (Right)    Patient location: PACU    Anesthesia Type: general    Transport from OR: Transported from OR on room air with adequate spontaneous ventilation    Post pain: adequate analgesia    Post assessment: no apparent anesthetic complications    Post vital signs: stable    Level of consciousness: responds to stimulation, awake and sedated    Nausea/Vomiting: no nausea/vomiting    Complications: none    Transfer of care protocol was followed      Last vitals:   Visit Vitals  /82 (BP Location: Left arm, Patient Position: Lying)   Pulse 81   Temp 36.6 °C (97.9 °F) (Oral)   Resp 15   Ht 5' 9" (1.753 m)   Wt 105 kg (231 lb 7.7 oz)   SpO2 100%   BMI 34.18 kg/m²     "

## 2022-04-27 NOTE — SUBJECTIVE & OBJECTIVE
Interval History: Feeling OK. We had long discussion on need for renal replacement including PD and HD. Wants to proceed with HD and may consider PD later.     Review of Systems   Respiratory:  Negative for shortness of breath.    Cardiovascular:  Negative for chest pain.   Gastrointestinal:  Negative for nausea.   Objective:     Vital Signs (Most Recent):  Temp: 97.5 °F (36.4 °C) (04/26/22 1930)  Pulse: 92 (04/26/22 1930)  Resp: 18 (04/26/22 1930)  BP: (!) 138/91 (04/26/22 1930)  SpO2: 100 % (04/26/22 1930)   Vital Signs (24h Range):  Temp:  [97.5 °F (36.4 °C)-98.3 °F (36.8 °C)] 97.5 °F (36.4 °C)  Pulse:  [79-92] 92  Resp:  [18-20] 18  SpO2:  [98 %-100 %] 100 %  BP: (116-138)/(74-96) 138/91     Weight: 105.4 kg (232 lb 5.8 oz)  Body mass index is 34.31 kg/m².    Intake/Output Summary (Last 24 hours) at 4/26/2022 2052  Last data filed at 4/26/2022 0400  Gross per 24 hour   Intake --   Output 400 ml   Net -400 ml      Physical Exam  Vitals reviewed.   Constitutional:       General: He is not in acute distress.     Appearance: He is not toxic-appearing.   Eyes:      General: No scleral icterus.  Cardiovascular:      Rate and Rhythm: Normal rate and regular rhythm.      Heart sounds: Normal heart sounds.   Pulmonary:      Effort: Pulmonary effort is normal. No respiratory distress.      Breath sounds: Normal breath sounds. No wheezing.   Abdominal:      General: Abdomen is flat. Bowel sounds are normal. There is no distension.      Palpations: Abdomen is soft.   Musculoskeletal:      Right lower leg: No edema.      Left lower leg: No edema.   Skin:     General: Skin is warm and dry.   Neurological:      Mental Status: He is alert.       Significant Labs: All pertinent labs within the past 24 hours have been reviewed.  BMP:   Recent Labs   Lab 04/26/22  0336   GLU 78      K 6.0*   *   CO2 20*   BUN 77*   CREATININE 6.42*   CALCIUM 7.4*     CBC:   Recent Labs   Lab 04/25/22  0302   WBC 4.99   HGB 11.0*   HCT  36.8*          Significant Imaging: I have reviewed all pertinent imaging results/findings within the past 24 hours.

## 2022-04-28 PROBLEM — I82.619: Status: ACTIVE | Noted: 2022-04-28

## 2022-04-28 PROBLEM — G45.9 TIA (TRANSIENT ISCHEMIC ATTACK): Status: RESOLVED | Noted: 2022-04-24 | Resolved: 2022-04-28

## 2022-04-28 PROCEDURE — 90935 HEMODIALYSIS ONE EVALUATION: CPT

## 2022-04-28 PROCEDURE — 11000001 HC ACUTE MED/SURG PRIVATE ROOM

## 2022-04-28 PROCEDURE — 25000003 PHARM REV CODE 250: Performed by: FAMILY MEDICINE

## 2022-04-28 PROCEDURE — 25000003 PHARM REV CODE 250: Performed by: HOSPITALIST

## 2022-04-28 PROCEDURE — 99232 PR SUBSEQUENT HOSPITAL CARE,LEVL II: ICD-10-PCS | Mod: ,,, | Performed by: FAMILY MEDICINE

## 2022-04-28 PROCEDURE — 25000003 PHARM REV CODE 250: Performed by: INTERNAL MEDICINE

## 2022-04-28 PROCEDURE — 99232 SBSQ HOSP IP/OBS MODERATE 35: CPT | Mod: ,,, | Performed by: FAMILY MEDICINE

## 2022-04-28 RX ADMIN — SODIUM BICARBONATE 650 MG: 650 TABLET ORAL at 02:04

## 2022-04-28 RX ADMIN — SODIUM BICARBONATE 650 MG: 650 TABLET ORAL at 10:04

## 2022-04-28 RX ADMIN — ASPIRIN 81 MG: 81 TABLET, COATED ORAL at 02:04

## 2022-04-28 RX ADMIN — APIXABAN 2.5 MG: 2.5 TABLET, FILM COATED ORAL at 10:04

## 2022-04-28 RX ADMIN — SODIUM CHLORIDE: 9 INJECTION, SOLUTION INTRAVENOUS at 09:04

## 2022-04-28 RX ADMIN — CARVEDILOL 12.5 MG: 12.5 TABLET, FILM COATED ORAL at 10:04

## 2022-04-28 RX ADMIN — SEVELAMER CARBONATE 800 MG: 800 TABLET, FILM COATED ORAL at 02:04

## 2022-04-28 RX ADMIN — TRAZODONE HYDROCHLORIDE 50 MG: 50 TABLET ORAL at 10:04

## 2022-04-28 RX ADMIN — SEVELAMER CARBONATE 800 MG: 800 TABLET, FILM COATED ORAL at 04:04

## 2022-04-28 RX ADMIN — CARVEDILOL 12.5 MG: 12.5 TABLET, FILM COATED ORAL at 02:04

## 2022-04-28 NOTE — DIALYSIS ROUNDING
The patient was dialyzed today without complication.He tolerated the procedure well.  PE  Lungs-clear  Cor-no murmur or rub  Abd-soft    Plan:  Hemodialysis tomorrow

## 2022-04-29 LAB
ALBUMIN SERPL BCP-MCNC: 3.2 G/DL (ref 3.5–5)
ANION GAP SERPL CALCULATED.3IONS-SCNC: 12 MMOL/L (ref 7–16)
BUN SERPL-MCNC: 43 MG/DL (ref 7–18)
BUN/CREAT SERPL: 9 (ref 6–20)
CALCIUM SERPL-MCNC: 7.6 MG/DL (ref 8.5–10.1)
CHLORIDE SERPL-SCNC: 102 MMOL/L (ref 98–107)
CO2 SERPL-SCNC: 27 MMOL/L (ref 21–32)
CREAT SERPL-MCNC: 4.91 MG/DL (ref 0.7–1.3)
GLUCOSE SERPL-MCNC: 96 MG/DL (ref 74–106)
PHOSPHATE SERPL-MCNC: 4.6 MG/DL (ref 2.5–4.5)
POTASSIUM SERPL-SCNC: 3.8 MMOL/L (ref 3.5–5.1)
SODIUM SERPL-SCNC: 137 MMOL/L (ref 136–145)

## 2022-04-29 PROCEDURE — 11000001 HC ACUTE MED/SURG PRIVATE ROOM

## 2022-04-29 PROCEDURE — 25000003 PHARM REV CODE 250: Performed by: INTERNAL MEDICINE

## 2022-04-29 PROCEDURE — 80069 RENAL FUNCTION PANEL: CPT | Performed by: FAMILY MEDICINE

## 2022-04-29 PROCEDURE — 25000003 PHARM REV CODE 250: Performed by: HOSPITALIST

## 2022-04-29 PROCEDURE — 25000003 PHARM REV CODE 250: Performed by: FAMILY MEDICINE

## 2022-04-29 PROCEDURE — 99232 SBSQ HOSP IP/OBS MODERATE 35: CPT | Mod: ,,, | Performed by: INTERNAL MEDICINE

## 2022-04-29 PROCEDURE — 90935 HEMODIALYSIS ONE EVALUATION: CPT

## 2022-04-29 PROCEDURE — 36415 COLL VENOUS BLD VENIPUNCTURE: CPT | Performed by: FAMILY MEDICINE

## 2022-04-29 PROCEDURE — 99232 PR SUBSEQUENT HOSPITAL CARE,LEVL II: ICD-10-PCS | Mod: ,,, | Performed by: INTERNAL MEDICINE

## 2022-04-29 RX ADMIN — SODIUM CHLORIDE: 9 INJECTION, SOLUTION INTRAVENOUS at 10:04

## 2022-04-29 RX ADMIN — SODIUM BICARBONATE 650 MG: 650 TABLET ORAL at 09:04

## 2022-04-29 RX ADMIN — SEVELAMER CARBONATE 800 MG: 800 TABLET, FILM COATED ORAL at 04:04

## 2022-04-29 RX ADMIN — SEVELAMER CARBONATE 800 MG: 800 TABLET, FILM COATED ORAL at 01:04

## 2022-04-29 RX ADMIN — CARVEDILOL 12.5 MG: 12.5 TABLET, FILM COATED ORAL at 09:04

## 2022-04-29 RX ADMIN — APIXABAN 2.5 MG: 2.5 TABLET, FILM COATED ORAL at 09:04

## 2022-04-29 NOTE — DIALYSIS ROUNDING
The patient was dialyzed today without complication.His appetite is fair  PE  Lungs-clear  Cor-no rub  Abd-soft    Plan:  Continue hemodialysis three time weekly

## 2022-04-29 NOTE — PROGRESS NOTES
[facility] - [address] [place] [address] Medicine  Progress Note    Patient Name: Catalina Stephens  MRN: [id number]  Patient Class: IP- Inpatient   Admission Date: 4/24/2022  Length of Stay: 4 days  Attending Physician: Cali Dodd Jr., MD  Primary Care Provider: Ja Hernandez MD        Subjective:     Principal Problem:Acute kidney injury superimposed on chronic kidney disease        HPI:  56 yo M presents to [facility] ED with right arm tingling for one hour prior to coming to the ED.  Patient says he wasjust sitting in his recliner and his wife became concerned and took him to the ED.  Patient had a stat head CT which showed proptosis and left frontal sinusitis.  He has no HA or vision change nor does he c/o sinus pressure.      His labs showed a K of 7 along with worsening renal function with BUN 73 and creat of 6.  Patient states he sees Dr. Cas Guardado for PCKD and that his father had PCKD as well.  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 Montaño after a C which did not show any obstructing CAD.  Patient states that he has not had any change in UOP but admits he has not followed with Dr. Guardado in quite some time.      Patient was given a Delmer's cocktail for hyperkalemia and EKG did not have peaked Twaves and initial troponin was normal.  Repeat K was 5.  CXR did show cardiomegally but no infiltrates.        Overview/Hospital Course:  No notes on file    Interval History: Feeling OK. We had long discussion on need for renal replacement including PD and HD. Wants to proceed with HD and may consider PD later.     Review of Systems   Respiratory:  Negative for shortness of breath.    Cardiovascular:  Negative for chest pain.   Gastrointestinal:  Negative for nausea.   Objective:     Vital Signs (Most Recent):  Temp: 97.5 °F (36.4 °C) (04/26/22 1930)  Pulse: 92 (04/26/22 1930)  Resp: 18  (04/26/22 1930)  BP: (!) 138/91 (04/26/22 1930)  SpO2: 100 % (04/26/22 1930)   Vital Signs (24h Range):  Temp:  [97.5 °F (36.4 °C)-98.3 °F (36.8 °C)] 97.5 °F (36.4 °C)  Pulse:  [79-92] 92  Resp:  [18-20] 18  SpO2:  [98 %-100 %] 100 %  BP: (116-138)/(74-96) 138/91     Weight: 105.4 kg (232 lb 5.8 oz)  Body mass index is 34.31 kg/m².    Intake/Output Summary (Last 24 hours) at 4/26/2022 2052  Last data filed at 4/26/2022 0400  Gross per 24 hour   Intake --   Output 400 ml   Net -400 ml      Physical Exam  Vitals reviewed.   Constitutional:       General: He is not in acute distress.     Appearance: He is not toxic-appearing.   Eyes:      General: No scleral icterus.  Cardiovascular:      Rate and Rhythm: Normal rate and regular rhythm.      Heart sounds: Normal heart sounds.   Pulmonary:      Effort: Pulmonary effort is normal. No respiratory distress.      Breath sounds: Normal breath sounds. No wheezing.   Abdominal:      General: Abdomen is flat. Bowel sounds are normal. There is no distension.      Palpations: Abdomen is soft.   Musculoskeletal:      Right lower leg: No edema.      Left lower leg: No edema.   Skin:     General: Skin is warm and dry.   Neurological:      Mental Status: He is alert.       Significant Labs: All pertinent labs within the past 24 hours have been reviewed.  BMP:   Recent Labs   Lab 04/26/22  0336   GLU 78      K 6.0*   *   CO2 20*   BUN 77*   CREATININE 6.42*   CALCIUM 7.4*     CBC:   Recent Labs   Lab 04/25/22  0302   WBC 4.99   HGB 11.0*   HCT 36.8*          Significant Imaging: I have reviewed all pertinent imaging results/findings within the past 24 hours.      Assessment/Plan:      * Acute kidney injury superimposed on chronic kidney disease  Gentle hydration  Stop diuretic  Continue BB but no nephrotoxic agents  Renal US  Will give 1 g mag and recheck all labs  Monitor on telemetry  Appreciate nephrology assistance.      4/25 - no improvement in creatinine. He  is making urine.     4/26 - K remains an isssue, will retreat with kayexalate. Acidosis controlled with po bicarb.  Creatinine rising.              Consult surgery for tunneled cath tomorrow. Probable HD at Dr. Real's discretion.       Hyperkalemia  Stop aldactone  Recheck k now down to 5.3 after Delmer's cocktail.     4/25 - back to 5.7. Will give dose of kayexalate.       TIA (transient ischemic attack)  Will start low dose asa.    Check echo  Check carotids  Check ESR  Check lipids.    Neuro checks  Monitor on telemetry      4/25 - carotids ok.       VTE Risk Mitigation (From admission, onward)         Ordered     heparin (porcine) injection 4,000 Units  As needed (PRN)         04/27/22 3361                Discharge Planning   JEY:      Code Status: Full Code   Is the patient medically ready for discharge?:     Reason for patient still in hospital (select all that apply): Treatment  Discharge Plan A: Home                  Cali Dodd Jr, MD  Department of Hospital Medicine   [address]

## 2022-04-29 NOTE — PROGRESS NOTES
[facility] - [address] [place] [address] Medicine  Progress Note    Patient Name: Catalina Stephens  MRN: [id number]  Patient Class: IP- Inpatient   Admission Date: 4/24/2022  Length of Stay: 4 days  Attending Physician: Cali Dodd Jr., MD  Primary Care Provider: Ja Hernandez MD        Subjective:     Principal Problem:Acute kidney injury superimposed on chronic kidney disease        HPI:  56 yo M presents to [facility] ED with right arm tingling for one hour prior to coming to the ED.  Patient says he wasjust sitting in his recliner and his wife became concerned and took him to the ED.  Patient had a stat head CT which showed proptosis and left frontal sinusitis.  He has no HA or vision change nor does he c/o sinus pressure.      His labs showed a K of 7 along with worsening renal function with BUN 73 and creat of 6.  Patient states he sees Dr. Cas Guardado for PCKD and that his father had PCKD as well.  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 Montaño after a C which did not show any obstructing CAD.  Patient states that he has not had any change in UOP but admits he has not followed with Dr. Guardado in quite some time.      Patient was given a Delmer's cocktail for hyperkalemia and EKG did not have peaked Twaves and initial troponin was normal.  Repeat K was 5.  CXR did show cardiomegally but no infiltrates.        Overview/Hospital Course:  No notes on file    Interval History:  Tolerating HD well. He reports they are going to run him one more time tomorrow.  Of note, vein mapping US shows R cephalic vein thrombus.     Review of Systems   Respiratory:  Negative for shortness of breath.    Cardiovascular:  Negative for chest pain.   Gastrointestinal:  Negative for abdominal pain, nausea and vomiting.   Objective:     Vital Signs (Most Recent):  Temp: 98.3 °F (36.8 °C) (04/28/22 1600)  Pulse: 80  (04/28/22 1600)  Resp: 18 (04/28/22 1600)  BP: (!) 134/94 (04/28/22 1600)  SpO2: 99 % (04/28/22 1600)   Vital Signs (24h Range):  Temp:  [97.7 °F (36.5 °C)-98.3 °F (36.8 °C)] 98.3 °F (36.8 °C)  Pulse:  [63-96] 80  Resp:  [18-19] 18  SpO2:  [97 %-99 %] 99 %  BP: (115-153)/(64-94) 134/94     Weight: 101.5 kg (223 lb 12.3 oz)  Body mass index is 33.04 kg/m².    Intake/Output Summary (Last 24 hours) at 4/28/2022 2005  Last data filed at 4/28/2022 1202  Gross per 24 hour   Intake --   Output 2500 ml   Net -2500 ml      Physical Exam  Vitals reviewed.   Constitutional:       General: He is not in acute distress.     Appearance: Normal appearance.   HENT:      Head: Normocephalic and atraumatic.   Cardiovascular:      Rate and Rhythm: Normal rate and regular rhythm.      Pulses: Normal pulses.      Heart sounds: Normal heart sounds.   Pulmonary:      Effort: Pulmonary effort is normal. No respiratory distress.      Breath sounds: Normal breath sounds. No wheezing.   Abdominal:      General: Abdomen is flat. Bowel sounds are normal. There is no distension.      Palpations: Abdomen is soft.      Tenderness: There is no abdominal tenderness.   Musculoskeletal:         General: No swelling.      Right lower leg: No edema.      Left lower leg: No edema.   Skin:     General: Skin is warm and dry.   Neurological:      Mental Status: He is alert.       Significant Labs: All pertinent labs within the past 24 hours have been reviewed.  BMP: No results for input(s): GLU, NA, K, CL, CO2, BUN, CREATININE, CALCIUM, MG in the last 48 hours.  CBC: No results for input(s): WBC, HGB, HCT, PLT in the last 48 hours.    Significant Imaging: I have reviewed all pertinent imaging results/findings within the past 24 hours.      Assessment/Plan:      * Acute kidney injury superimposed on chronic kidney disease  Gentle hydration  Stop diuretic  Continue BB but no nephrotoxic agents  Renal US  Will give 1 g mag and recheck all labs  Monitor on  telemetry  Appreciate nephrology assistance.      4/25 - no improvement in creatinine. He is making urine.     4/26 - K remains an isssue, will retreat with kayexalate. Acidosis controlled with po bicarb.  Creatinine rising.              Consult surgery for tunneled cath tomorrow. Probable HD at Dr. Real's discretion.     4/27 - proceeding with HD      Hyperkalemia  Stop aldactone  Recheck k now down to 5.3 after Delmer's cocktail.     4/25 - back to 5.7. Will give dose of kayexalate.     4/27 - Should resolve with HD       Acute thrombosis of cephalic vein    Cephalic vein thrombosis.  This is a superficial vein but will plan 6 weeks of Eliquis 2.5 BID.       VTE Risk Mitigation (From admission, onward)         Ordered     heparin (porcine) injection 4,000 Units  As needed (PRN)         04/27/22 6686                Discharge Planning   JEY:      Code Status: Full Code   Is the patient medically ready for discharge?:     Reason for patient still in hospital (select all that apply): Treatment  Discharge Plan A: Home                  Cali Dodd Jr, MD  Department of Hospital Medicine   [address]

## 2022-04-29 NOTE — ASSESSMENT & PLAN NOTE
Stop aldactone  Recheck k now down to 5.3 after Delmer's cocktail.     4/25 - back to 5.7. Will give dose of kayexalate.     4/27 - Should resolve with HD

## 2022-04-29 NOTE — ASSESSMENT & PLAN NOTE
Cephalic vein thrombosis.  This is a superficial vein but will plan 6 weeks of Eliquis 2.5 BID.

## 2022-04-29 NOTE — SUBJECTIVE & OBJECTIVE
Interval History: HD cath placed. Tolerated HD following.     Review of Systems   Respiratory:  Negative for shortness of breath.    Cardiovascular:  Negative for chest pain.   Gastrointestinal:  Negative for abdominal pain and nausea.   Objective:     Vital Signs (Most Recent):  Temp: 98.3 °F (36.8 °C) (04/28/22 1600)  Pulse: 80 (04/28/22 1600)  Resp: 18 (04/28/22 1600)  BP: (!) 134/94 (04/28/22 1600)  SpO2: 99 % (04/28/22 1600)   Vital Signs (24h Range):  Temp:  [97.7 °F (36.5 °C)-98.3 °F (36.8 °C)] 98.3 °F (36.8 °C)  Pulse:  [63-96] 80  Resp:  [18-19] 18  SpO2:  [97 %-99 %] 99 %  BP: (115-153)/(64-94) 134/94     Weight: 101.5 kg (223 lb 12.3 oz)  Body mass index is 33.04 kg/m².    Intake/Output Summary (Last 24 hours) at 4/28/2022 2001  Last data filed at 4/28/2022 1202  Gross per 24 hour   Intake --   Output 2500 ml   Net -2500 ml      Physical Exam  Vitals reviewed.   Constitutional:       General: He is not in acute distress.     Appearance: Normal appearance. He is not ill-appearing.   Cardiovascular:      Rate and Rhythm: Normal rate and regular rhythm.   Pulmonary:      Effort: Pulmonary effort is normal. No respiratory distress.      Breath sounds: Normal breath sounds. No wheezing.   Skin:     General: Skin is warm and dry.   Neurological:      Mental Status: He is alert.       Significant Labs: All pertinent labs within the past 24 hours have been reviewed.  BMP: No results for input(s): GLU, NA, K, CL, CO2, BUN, CREATININE, CALCIUM, MG in the last 48 hours.  CBC: No results for input(s): WBC, HGB, HCT, PLT in the last 48 hours.    Significant Imaging: I have reviewed all pertinent imaging results/findings within the past 24 hours.

## 2022-04-29 NOTE — ASSESSMENT & PLAN NOTE
Gentle hydration  Stop diuretic  Continue BB but no nephrotoxic agents  Renal US  Will give 1 g mag and recheck all labs  Monitor on telemetry  Appreciate nephrology assistance.      4/25 - no improvement in creatinine. He is making urine.     4/26 - K remains an isssue, will retreat with kayexalate. Acidosis controlled with po bicarb.  Creatinine rising.              Consult surgery for tunneled cath tomorrow. Probable HD at Dr. Real's discretion.     4/27 - proceeding with HD

## 2022-04-29 NOTE — SUBJECTIVE & OBJECTIVE
Interval History:  Tolerating HD well. He reports they are going to run him one more time tomorrow.  Of note, vein mapping US shows R cephalic vein thrombus.     Review of Systems   Respiratory:  Negative for shortness of breath.    Cardiovascular:  Negative for chest pain.   Gastrointestinal:  Negative for abdominal pain, nausea and vomiting.   Objective:     Vital Signs (Most Recent):  Temp: 98.3 °F (36.8 °C) (04/28/22 1600)  Pulse: 80 (04/28/22 1600)  Resp: 18 (04/28/22 1600)  BP: (!) 134/94 (04/28/22 1600)  SpO2: 99 % (04/28/22 1600)   Vital Signs (24h Range):  Temp:  [97.7 °F (36.5 °C)-98.3 °F (36.8 °C)] 98.3 °F (36.8 °C)  Pulse:  [63-96] 80  Resp:  [18-19] 18  SpO2:  [97 %-99 %] 99 %  BP: (115-153)/(64-94) 134/94     Weight: 101.5 kg (223 lb 12.3 oz)  Body mass index is 33.04 kg/m².    Intake/Output Summary (Last 24 hours) at 4/28/2022 2005  Last data filed at 4/28/2022 1202  Gross per 24 hour   Intake --   Output 2500 ml   Net -2500 ml      Physical Exam  Vitals reviewed.   Constitutional:       General: He is not in acute distress.     Appearance: Normal appearance.   HENT:      Head: Normocephalic and atraumatic.   Cardiovascular:      Rate and Rhythm: Normal rate and regular rhythm.      Pulses: Normal pulses.      Heart sounds: Normal heart sounds.   Pulmonary:      Effort: Pulmonary effort is normal. No respiratory distress.      Breath sounds: Normal breath sounds. No wheezing.   Abdominal:      General: Abdomen is flat. Bowel sounds are normal. There is no distension.      Palpations: Abdomen is soft.      Tenderness: There is no abdominal tenderness.   Musculoskeletal:         General: No swelling.      Right lower leg: No edema.      Left lower leg: No edema.   Skin:     General: Skin is warm and dry.   Neurological:      Mental Status: He is alert.       Significant Labs: All pertinent labs within the past 24 hours have been reviewed.  BMP: No results for input(s): GLU, NA, K, CL, CO2, BUN,  CREATININE, CALCIUM, MG in the last 48 hours.  CBC: No results for input(s): WBC, HGB, HCT, PLT in the last 48 hours.    Significant Imaging: I have reviewed all pertinent imaging results/findings within the past 24 hours.

## 2022-04-29 NOTE — PROGRESS NOTES
[facility] - [address] [place] [address] Medicine  Progress Note    Patient Name: Catalina Stephens  MRN: [id number]  Patient Class: IP- Inpatient   Admission Date: 4/24/2022  Length of Stay: 4 days  Attending Physician: Cali Dodd Jr., MD  Primary Care Provider: Ja Hernandez MD        Subjective:     Principal Problem:Acute kidney injury superimposed on chronic kidney disease        HPI:  56 yo M presents to [facility] ED with right arm tingling for one hour prior to coming to the ED.  Patient says he wasjust sitting in his recliner and his wife became concerned and took him to the ED.  Patient had a stat head CT which showed proptosis and left frontal sinusitis.  He has no HA or vision change nor does he c/o sinus pressure.      His labs showed a K of 7 along with worsening renal function with BUN 73 and creat of 6.  Patient states he sees Dr. Cas Guardado for PCKD and that his father had PCKD as well.  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 Montaño after a C which did not show any obstructing CAD.  Patient states that he has not had any change in UOP but admits he has not followed with Dr. Guardado in quite some time.      Patient was given a Delmer's cocktail for hyperkalemia and EKG did not have peaked Twaves and initial troponin was normal.  Repeat K was 5.  CXR did show cardiomegally but no infiltrates.        Overview/Hospital Course:  No notes on file    Interval History: HD cath placed. Tolerated HD following.     Review of Systems   Respiratory:  Negative for shortness of breath.    Cardiovascular:  Negative for chest pain.   Gastrointestinal:  Negative for abdominal pain and nausea.   Objective:     Vital Signs (Most Recent):  Temp: 98.3 °F (36.8 °C) (04/28/22 1600)  Pulse: 80 (04/28/22 1600)  Resp: 18 (04/28/22 1600)  BP: (!) 134/94 (04/28/22 1600)  SpO2: 99 % (04/28/22 1600)   Vital  Signs (24h Range):  Temp:  [97.7 °F (36.5 °C)-98.3 °F (36.8 °C)] 98.3 °F (36.8 °C)  Pulse:  [63-96] 80  Resp:  [18-19] 18  SpO2:  [97 %-99 %] 99 %  BP: (115-153)/(64-94) 134/94     Weight: 101.5 kg (223 lb 12.3 oz)  Body mass index is 33.04 kg/m².    Intake/Output Summary (Last 24 hours) at 4/28/2022 2001  Last data filed at 4/28/2022 1202  Gross per 24 hour   Intake --   Output 2500 ml   Net -2500 ml      Physical Exam  Vitals reviewed.   Constitutional:       General: He is not in acute distress.     Appearance: Normal appearance. He is not ill-appearing.   Cardiovascular:      Rate and Rhythm: Normal rate and regular rhythm.   Pulmonary:      Effort: Pulmonary effort is normal. No respiratory distress.      Breath sounds: Normal breath sounds. No wheezing.   Skin:     General: Skin is warm and dry.   Neurological:      Mental Status: He is alert.       Significant Labs: All pertinent labs within the past 24 hours have been reviewed.  BMP: No results for input(s): GLU, NA, K, CL, CO2, BUN, CREATININE, CALCIUM, MG in the last 48 hours.  CBC: No results for input(s): WBC, HGB, HCT, PLT in the last 48 hours.    Significant Imaging: I have reviewed all pertinent imaging results/findings within the past 24 hours.      Assessment/Plan:      * Acute kidney injury superimposed on chronic kidney disease  Gentle hydration  Stop diuretic  Continue BB but no nephrotoxic agents  Renal US  Will give 1 g mag and recheck all labs  Monitor on telemetry  Appreciate nephrology assistance.      4/25 - no improvement in creatinine. He is making urine.     4/26 - K remains an isssue, will retreat with kayexalate. Acidosis controlled with po bicarb.  Creatinine rising.              Consult surgery for tunneled cath tomorrow. Probable HD at Dr. Real's discretion.     4/27 - proceeding with HD      Hyperkalemia  Stop aldactone  Recheck k now down to 5.3 after Delmer's cocktail.     4/25 - back to 5.7. Will give dose of kayexalate.     4/27  - Should resolve with HD         VTE Risk Mitigation (From admission, onward)         Ordered     heparin (porcine) injection 4,000 Units  As needed (PRN)         04/27/22 1236                Discharge Planning   JEY:      Code Status: Full Code   Is the patient medically ready for discharge?:     Reason for patient still in hospital (select all that apply): Treatment  Discharge Plan A: Home                  Cali Dodd Jr, MD  Department of Hospital Medicine   [address]

## 2022-04-29 NOTE — PLAN OF CARE
Problem: Adult Inpatient Plan of Care  Goal: Plan of Care Review  Outcome: Ongoing, Progressing  Goal: Patient-Specific Goal (Individualized)  Outcome: Ongoing, Progressing  Goal: Absence of Hospital-Acquired Illness or Injury  Outcome: Ongoing, Progressing  Goal: Optimal Comfort and Wellbeing  Outcome: Ongoing, Progressing  Goal: Readiness for Transition of Care  Outcome: Ongoing, Progressing     Problem: Fall Injury Risk  Goal: Absence of Fall and Fall-Related Injury  Outcome: Ongoing, Progressing     Problem: Fluid and Electrolyte Imbalance (Acute Kidney Injury/Impairment)  Goal: Fluid and Electrolyte Balance  Outcome: Ongoing, Progressing     Problem: Oral Intake Inadequate (Acute Kidney Injury/Impairment)  Goal: Optimal Nutrition Intake  Outcome: Ongoing, Progressing     Problem: Renal Function Impairment (Acute Kidney Injury/Impairment)  Goal: Effective Renal Function  Outcome: Ongoing, Progressing     Problem: Device-Related Complication Risk (Hemodialysis)  Goal: Safe, Effective Therapy Delivery  Outcome: Ongoing, Progressing     Problem: Infection (Hemodialysis)  Goal: Absence of Infection Signs and Symptoms  Outcome: Ongoing, Progressing     Problem: Infection  Goal: Absence of Infection Signs and Symptoms  Outcome: Ongoing, Progressing

## 2022-04-29 NOTE — PROGRESS NOTES
[facility] - [address] [place] [address] Medicine  Progress Note    Patient Name: Catalina Stephens  MRN: [id number]  Patient Class: IP- Inpatient   Admission Date: 4/24/2022  Length of Stay: 5 days  Attending Physician: Cali Hidalgo MD  Primary Care Provider: Ja Hernandez MD        Subjective:     Principal Problem:Acute kidney injury superimposed on chronic kidney disease        HPI:  58 yo M presents to [facility] ED with right arm tingling for one hour prior to coming to the ED.  Patient says he wasjust sitting in his recliner and his wife became concerned and took him to the ED.  Patient had a stat head CT which showed proptosis and left frontal sinusitis.  He has no HA or vision change nor does he c/o sinus pressure.      His labs showed a K of 7 along with worsening renal function with BUN 73 and creat of 6.  Patient states he sees Dr. Cas Guardado for PCKD and that his father had PCKD as well.  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 Montaño after a C which did not show any obstructing CAD.  Patient states that he has not had any change in UOP but admits he has not followed with Dr. Guardado in quite some time.      Patient was given a Delmer's cocktail for hyperkalemia and EKG did not have peaked Twaves and initial troponin was normal.  Repeat K was 5.  CXR did show cardiomegally but no infiltrates.        Overview/Hospital Course:  No notes on file    No new subjective & objective note has been filed under this hospital service since the last note was generated.    Patie seen evaluate on 04/29/2022  Patient seen in the hemodialysis department.  Patient awake alert patient voiced no complaints today.  Lungs are clear no crackles or wheezing cardiovascular S1-S2 regular rate rhythm abdomen is soft positive bowel sounds nontender extremities nonedematous neuro nonfocal.  Patient has  polycystic kidney disease which has progressed to end-stage renal disease.  Will continue hemodialysis DC home when okay with Nephrology.  Assessment/Plan:      * Acute kidney injury superimposed on chronic kidney disease  Gentle hydration  Stop diuretic  Continue BB but no nephrotoxic agents  Renal US  Will give 1 g mag and recheck all labs  Monitor on telemetry  Appreciate nephrology assistance.      4/25 - no improvement in creatinine. He is making urine.     4/26 - K remains an isssue, will retreat with kayexalate. Acidosis controlled with po bicarb.  Creatinine rising.              Consult surgery for tunneled cath tomorrow. Probable HD at Dr. Real's discretion.     4/27 - proceeding with HD      Acute thrombosis of cephalic vein    Cephalic vein thrombosis.  This is a superficial vein but will plan 6 weeks of Eliquis 2.5 BID.     Hyperkalemia  Stop aldactone  Recheck k now down to 5.3 after Delmer's cocktail.     4/25 - back to 5.7. Will give dose of kayexalate.     4/27 - Should resolve with HD       VTE Risk Mitigation (From admission, onward)         Ordered     apixaban tablet 2.5 mg  2 times daily         04/28/22 2013     heparin (porcine) injection 4,000 Units  As needed (PRN)         04/27/22 1546                Discharge Planning   JEY:      Code Status: Full Code   Is the patient medically ready for discharge?:     Reason for patient still in hospital (select all that apply): Treatment  Discharge Plan A: Home                  Cali Hidalgo MD  Department of Hospital Medicine   [facility] - [address] [place] [address]

## 2022-04-30 PROBLEM — E87.5 HYPERKALEMIA: Status: RESOLVED | Noted: 2022-04-24 | Resolved: 2022-04-30

## 2022-04-30 PROCEDURE — 25000003 PHARM REV CODE 250: Performed by: INTERNAL MEDICINE

## 2022-04-30 PROCEDURE — 25000003 PHARM REV CODE 250: Performed by: FAMILY MEDICINE

## 2022-04-30 PROCEDURE — 99232 SBSQ HOSP IP/OBS MODERATE 35: CPT | Mod: ,,, | Performed by: INTERNAL MEDICINE

## 2022-04-30 PROCEDURE — 25000003 PHARM REV CODE 250: Performed by: HOSPITALIST

## 2022-04-30 PROCEDURE — 99232 PR SUBSEQUENT HOSPITAL CARE,LEVL II: ICD-10-PCS | Mod: ,,, | Performed by: INTERNAL MEDICINE

## 2022-04-30 PROCEDURE — 11000001 HC ACUTE MED/SURG PRIVATE ROOM

## 2022-04-30 RX ADMIN — GUAIFENESIN AND DEXTROMETHORPHAN 10 ML: 100; 10 SYRUP ORAL at 09:04

## 2022-04-30 RX ADMIN — SEVELAMER CARBONATE 800 MG: 800 TABLET, FILM COATED ORAL at 05:04

## 2022-04-30 RX ADMIN — CARVEDILOL 12.5 MG: 12.5 TABLET, FILM COATED ORAL at 09:04

## 2022-04-30 RX ADMIN — SEVELAMER CARBONATE 800 MG: 800 TABLET, FILM COATED ORAL at 12:04

## 2022-04-30 RX ADMIN — APIXABAN 2.5 MG: 2.5 TABLET, FILM COATED ORAL at 09:04

## 2022-04-30 RX ADMIN — ASPIRIN 81 MG: 81 TABLET, COATED ORAL at 09:04

## 2022-04-30 RX ADMIN — SODIUM BICARBONATE 650 MG: 650 TABLET ORAL at 09:04

## 2022-04-30 RX ADMIN — TRAZODONE HYDROCHLORIDE 50 MG: 50 TABLET ORAL at 09:04

## 2022-04-30 RX ADMIN — SEVELAMER CARBONATE 800 MG: 800 TABLET, FILM COATED ORAL at 07:04

## 2022-04-30 NOTE — PROGRESS NOTES
[address]  Nephrology  Progress Note    Patient Name: Catalina Stephens  MRN: [id number]  Admission Date: 4/24/2022  Hospital Length of Stay: 6 days  Attending Provider: Cali Hidalgo MD   Primary Care Physician: Ja Hernandez MD  Principal Problem:Acute kidney injury superimposed on chronic kidney disease    Consults  Subjective:     Interval History: F/U ESRD. Dialyzed this week and stable today    Review of patient's allergies indicates:   Allergen Reactions    Ace inhibitors Swelling    Iodine and iodide containing products Nausea And Vomiting     SHRIMP     Current Facility-Administered Medications   Medication Frequency    0.9%  NaCl infusion PRN    acetaminophen tablet 1,000 mg Q6H PRN    apixaban tablet 2.5 mg BID    aspirin EC tablet 81 mg Daily    bisacodyL EC tablet 10 mg Daily PRN    carvediloL tablet 12.5 mg BID    dextromethorphan-guaiFENesin  mg/5 ml liquid 10 mL Q6H PRN    heparin (porcine) injection 4,000 Units PRN    HYDROcodone-acetaminophen 7.5-325 mg per tablet 1 tablet Q6H PRN    ondansetron injection 8 mg Q6H PRN    sevelamer carbonate tablet 800 mg TID WM    simethicone chewable tablet 80 mg TID PRN    sodium bicarbonate tablet 650 mg BID    traZODone tablet 50 mg Nightly PRN       Objective:     Vital Signs (Most Recent):  Temp: 97.9 °F (36.6 °C) (04/30/22 0710)  Pulse: 93 (04/30/22 0710)  Resp: 18 (04/30/22 0710)  BP: 131/83 (04/30/22 0710)  SpO2: 97 % (04/30/22 0710)  O2 Device (Oxygen Therapy): room air (04/30/22 0710) Vital Signs (24h Range):  Temp:  [97.9 °F (36.6 °C)-98.5 °F (36.9 °C)] 97.9 °F (36.6 °C)  Pulse:  [66-95] 93  Resp:  [16-18] 18  SpO2:  [95 %-97 %] 97 %  BP: (113-134)/(73-88) 131/83     Weight: 100 kg (220 lb 7.4 oz) (04/29/22 0000)  Body mass index is 32.56 kg/m².  Body surface area is 2.21 meters squared.    I/O last 3 completed shifts:  In: -   Out: 3300 [Other:3300]    Physical Exam Dialysis cath R chest.  Chest clear.     Significant Labs:sure  BMP:   Recent Labs   Lab 04/24/22  0706 04/24/22  1516 04/29/22  0353   GLU 85   < > 96      < > 137   K 6.1*   < > 3.8      < > 102   CO2 18*   < > 27   BUN 72*   < > 43*   CREATININE 5.79*   < > 4.91*   CALCIUM 7.2*   < > 7.6*   MG 1.8  --   --     < > = values in this interval not displayed.     All labs within the past 24 hours have been reviewed.    Significant Imaging:  Labs: Reviewed    Assessment/Plan:     Active Diagnoses:    Diagnosis Date Noted POA    PRINCIPAL PROBLEM:  Acute kidney injury superimposed on chronic kidney disease [N17.9, N18.9] 04/24/2022 Yes    Acute thrombosis of cephalic vein [I82.619] 04/28/2022 Yes      Problems Resolved During this Admission:    Diagnosis Date Noted Date Resolved POA    TIA (transient ischemic attack) [G45.9] 04/24/2022 04/28/2022 Yes    Hyperkalemia [E87.5] 04/24/2022 04/30/2022 Yes       Continue with his scheduled hemodialysis    Thank you for your consult. I will follow-up with patient. Please contact us if you have any additional questions.    Santiago Dobson MD  Nephrology  [address]

## 2022-04-30 NOTE — PLAN OF CARE
Problem: Adult Inpatient Plan of Care  Goal: Plan of Care Review  Outcome: Ongoing, Progressing  Goal: Patient-Specific Goal (Individualized)  Outcome: Ongoing, Progressing  Goal: Absence of Hospital-Acquired Illness or Injury  Outcome: Ongoing, Progressing  Goal: Optimal Comfort and Wellbeing  Outcome: Ongoing, Progressing  Goal: Readiness for Transition of Care  Outcome: Ongoing, Progressing     Problem: Fall Injury Risk  Goal: Absence of Fall and Fall-Related Injury  Outcome: Ongoing, Progressing     Problem: Fluid and Electrolyte Imbalance (Acute Kidney Injury/Impairment)  Goal: Fluid and Electrolyte Balance  Outcome: Ongoing, Progressing     Problem: Oral Intake Inadequate (Acute Kidney Injury/Impairment)  Goal: Optimal Nutrition Intake  Outcome: Ongoing, Progressing     Problem: Renal Function Impairment (Acute Kidney Injury/Impairment)  Goal: Effective Renal Function  Outcome: Ongoing, Progressing     Problem: Device-Related Complication Risk (Hemodialysis)  Goal: Safe, Effective Therapy Delivery  Outcome: Ongoing, Progressing     Problem: Hemodynamic Instability (Hemodialysis)  Goal: Effective Tissue Perfusion  Outcome: Ongoing, Progressing     Problem: Infection (Hemodialysis)  Goal: Absence of Infection Signs and Symptoms  Outcome: Ongoing, Progressing     Problem: Infection  Goal: Absence of Infection Signs and Symptoms  Outcome: Ongoing, Progressing  POC reviewed and continues

## 2022-04-30 NOTE — PLAN OF CARE
SW contacted by LINDA Fox inquiring about pt's dialysis schedule.  Phoned John @ [phone] ([facility]) to inquire about schedule at least three times with no answer. NP informed.  SW following,

## 2022-04-30 NOTE — PLAN OF CARE
Problem: Adult Inpatient Plan of Care  Goal: Plan of Care Review  Outcome: Ongoing, Progressing  Goal: Patient-Specific Goal (Individualized)  Outcome: Ongoing, Progressing  Goal: Absence of Hospital-Acquired Illness or Injury  Outcome: Ongoing, Progressing  Goal: Optimal Comfort and Wellbeing  Outcome: Ongoing, Progressing  Goal: Readiness for Transition of Care  Outcome: Ongoing, Progressing     Problem: Fall Injury Risk  Goal: Absence of Fall and Fall-Related Injury  Outcome: Ongoing, Progressing     Problem: Fluid and Electrolyte Imbalance (Acute Kidney Injury/Impairment)  Goal: Fluid and Electrolyte Balance  Outcome: Ongoing, Progressing     Problem: Oral Intake Inadequate (Acute Kidney Injury/Impairment)  Goal: Optimal Nutrition Intake  Outcome: Ongoing, Progressing     Problem: Renal Function Impairment (Acute Kidney Injury/Impairment)  Goal: Effective Renal Function  Outcome: Ongoing, Progressing     Problem: Device-Related Complication Risk (Hemodialysis)  Goal: Safe, Effective Therapy Delivery  Outcome: Ongoing, Progressing     Problem: Infection (Hemodialysis)  Goal: Absence of Infection Signs and Symptoms  Outcome: Ongoing, Progressing     Problem: Hemodynamic Instability (Hemodialysis)  Goal: Effective Tissue Perfusion  Outcome: Ongoing, Progressing     Problem: Infection (Hemodialysis)  Goal: Absence of Infection Signs and Symptoms  Outcome: Ongoing, Progressing     Problem: Infection  Goal: Absence of Infection Signs and Symptoms  Outcome: Ongoing, Progressing

## 2022-05-01 PROCEDURE — 99232 PR SUBSEQUENT HOSPITAL CARE,LEVL II: ICD-10-PCS | Mod: ,,, | Performed by: INTERNAL MEDICINE

## 2022-05-01 PROCEDURE — 11000001 HC ACUTE MED/SURG PRIVATE ROOM

## 2022-05-01 PROCEDURE — 99232 SBSQ HOSP IP/OBS MODERATE 35: CPT | Mod: ,,, | Performed by: INTERNAL MEDICINE

## 2022-05-01 PROCEDURE — 25000003 PHARM REV CODE 250: Performed by: FAMILY MEDICINE

## 2022-05-01 PROCEDURE — 25000003 PHARM REV CODE 250: Performed by: INTERNAL MEDICINE

## 2022-05-01 PROCEDURE — 25000003 PHARM REV CODE 250: Performed by: HOSPITALIST

## 2022-05-01 RX ADMIN — SEVELAMER CARBONATE 800 MG: 800 TABLET, FILM COATED ORAL at 07:05

## 2022-05-01 RX ADMIN — CARVEDILOL 12.5 MG: 12.5 TABLET, FILM COATED ORAL at 08:05

## 2022-05-01 RX ADMIN — SODIUM BICARBONATE 650 MG: 650 TABLET ORAL at 08:05

## 2022-05-01 RX ADMIN — APIXABAN 2.5 MG: 2.5 TABLET, FILM COATED ORAL at 09:05

## 2022-05-01 RX ADMIN — SEVELAMER CARBONATE 800 MG: 800 TABLET, FILM COATED ORAL at 04:05

## 2022-05-01 RX ADMIN — TRAZODONE HYDROCHLORIDE 50 MG: 50 TABLET ORAL at 09:05

## 2022-05-01 RX ADMIN — APIXABAN 2.5 MG: 2.5 TABLET, FILM COATED ORAL at 08:05

## 2022-05-01 RX ADMIN — ASPIRIN 81 MG: 81 TABLET, COATED ORAL at 08:05

## 2022-05-01 RX ADMIN — SODIUM BICARBONATE 650 MG: 650 TABLET ORAL at 09:05

## 2022-05-01 RX ADMIN — CARVEDILOL 12.5 MG: 12.5 TABLET, FILM COATED ORAL at 09:05

## 2022-05-01 RX ADMIN — SEVELAMER CARBONATE 800 MG: 800 TABLET, FILM COATED ORAL at 12:05

## 2022-05-01 NOTE — PROGRESS NOTES
[facility] [place] - [address] [place] [address] Medicine  Progress Note    Patient Name: Catalina Stephens  MRN: [id number]  Patient Class: IP- Inpatient   Admission Date: 4/24/2022  Length of Stay: 7 days  Attending Physician: Cali Hidalgo MD  Primary Care Provider: Ja Hernandez MD        Subjective:     Principal Problem:Acute kidney injury superimposed on chronic kidney disease        HPI:  56 yo M presents to [facility] ED with right arm tingling for one hour prior to coming to the ED.  Patient says he wasjust sitting in his recliner and his wife became concerned and took him to the ED.  Patient had a stat head CT which showed proptosis and left frontal sinusitis.  He has no HA or vision change nor does he c/o sinus pressure.      His labs showed a K of 7 along with worsening renal function with BUN 73 and creat of 6.  Patient states he sees Dr. Cas Guardado for PCKD and that his father had PCKD as well.  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 Montaño after a C which did not show any obstructing CAD.  Patient states that he has not had any change in UOP but admits he has not followed with Dr. Guardado in quite some time.      Patient was given a Delmer's cocktail for hyperkalemia and EKG did not have peaked Twaves and initial troponin was normal.  Repeat K was 5.  CXR did show cardiomegally but no infiltrates.        Overview/Hospital Course:  No notes on file    No new subjective & objective note has been filed under this hospital service since the last note was generated.    Patie seen evaluate on 04/30/2022  Patient awake alert patient voiced no complaints today.  Lungs are clear no crackles or wheezing cardiovascular S1-S2 regular rate rhythm abdomen is soft positive bowel sounds nontender extremities nonedematous neuro nonfocal.  Patient has polycystic kidney disease which has progressed to  end-stage renal disease.  Will continue hemodialysis DC home when okay with Nephrology.  Assessment/Plan:      * Acute kidney injury superimposed on chronic kidney disease  Gentle hydration  Stop diuretic  Continue BB but no nephrotoxic agents  Renal US  Will give 1 g mag and recheck all labs  Monitor on telemetry  Appreciate nephrology assistance.      4/25 - no improvement in creatinine. He is making urine.     4/26 - K remains an isssue, will retreat with kayexalate. Acidosis controlled with po bicarb.  Creatinine rising.              Consult surgery for tunneled cath tomorrow. Probable HD at Dr. Real's discretion.     4/27 - proceeding with HD      Acute thrombosis of cephalic vein    Cephalic vein thrombosis.  This is a superficial vein but will plan 6 weeks of Eliquis 2.5 BID.     VTE Risk Mitigation (From admission, onward)         Ordered     apixaban tablet 2.5 mg  2 times daily         04/28/22 2013     heparin (porcine) injection 4,000 Units  As needed (PRN)         04/27/22 1546                Discharge Planning   JEY:      Code Status: Full Code   Is the patient medically ready for discharge?:     Reason for patient still in hospital (select all that apply): Treatment  Discharge Plan A: Home                  Cali Hidalgo MD  Department of Hospital Medicine   [address]

## 2022-05-01 NOTE — PROGRESS NOTES
[facility] - [address] [place] [address] Medicine  Progress Note    Patient Name: Catalina Stephens  MRN: [id number]  Patient Class: IP- Inpatient   Admission Date: 4/24/2022  Length of Stay: 7 days  Attending Physician: Cali Hidalgo MD  Primary Care Provider: Ja Hernandez MD        Subjective:     Principal Problem:Acute kidney injury superimposed on chronic kidney disease        HPI:  58 yo M presents to [facility] ED with right arm tingling for one hour prior to coming to the ED.  Patient says he wasjust sitting in his recliner and his wife became concerned and took him to the ED.  Patient had a stat head CT which showed proptosis and left frontal sinusitis.  He has no HA or vision change nor does he c/o sinus pressure.      His labs showed a K of 7 along with worsening renal function with BUN 73 and creat of 6.  Patient states he sees Dr. Cas Guardado for PCKD and that his father had PCKD as well.  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 Montaño after a C which did not show any obstructing CAD.  Patient states that he has not had any change in UOP but admits he has not followed with Dr. Guardado in quite some time.      Patient was given a Delmer's cocktail for hyperkalemia and EKG did not have peaked Twaves and initial troponin was normal.  Repeat K was 5.  CXR did show cardiomegally but no infiltrates.        Overview/Hospital Course:  No notes on file    No new subjective & objective note has been filed under this hospital service since the last note was generated.    Patie seen evaluate on 05/01/2022  Patient awake alert patient voiced no complaints today.  Lungs are clear no crackles or wheezing cardiovascular S1-S2 regular rate rhythm abdomen is soft positive bowel sounds nontender extremities nonedematous neuro nonfocal.  Patient has polycystic kidney disease which has progressed to  end-stage renal disease.  Will continue hemodialysis DC home when okay with Nephrology.  Awaiting a chair for hemodialysis as an outpatient.  Assessment/Plan:      * Acute kidney injury superimposed on chronic kidney disease  Gentle hydration  Stop diuretic  Continue BB but no nephrotoxic agents  Renal US  Will give 1 g mag and recheck all labs  Monitor on telemetry  Appreciate nephrology assistance.      4/25 - no improvement in creatinine. He is making urine.     4/26 - K remains an isssue, will retreat with kayexalate. Acidosis controlled with po bicarb.  Creatinine rising.              Consult surgery for tunneled cath tomorrow. Probable HD at Dr. Real's discretion.     4/27 - proceeding with HD      Acute thrombosis of cephalic vein    Cephalic vein thrombosis.  This is a superficial vein but will plan 6 weeks of Eliquis 2.5 BID.     VTE Risk Mitigation (From admission, onward)         Ordered     apixaban tablet 2.5 mg  2 times daily         04/28/22 2013     heparin (porcine) injection 4,000 Units  As needed (PRN)         04/27/22 1546                Discharge Planning   JEY:      Code Status: Full Code   Is the patient medically ready for discharge?:     Reason for patient still in hospital (select all that apply): Treatment  Discharge Plan A: Home                  Cali Hidalgo MD  Department of Hospital Medicine   [address]

## 2022-05-01 NOTE — PROGRESS NOTES
[address]  Nephrology  Progress Note    Patient Name: Catalina Stephens  MRN: [id number]  Admission Date: 4/24/2022  Hospital Length of Stay: 7 days  Attending Provider: Cali Hidalgo MD   Primary Care Physician: Ja Hernandez MD  Principal Problem:Acute kidney injury superimposed on chronic kidney disease    Consults  Subjective:     Interval History: F/U Renal failure. Dialyzed this week. PCKD    Review of patient's allergies indicates:   Allergen Reactions    Ace inhibitors Swelling    Iodine and iodide containing products Nausea And Vomiting     SHRIMP     Current Facility-Administered Medications   Medication Frequency    0.9%  NaCl infusion PRN    acetaminophen tablet 1,000 mg Q6H PRN    apixaban tablet 2.5 mg BID    aspirin EC tablet 81 mg Daily    bisacodyL EC tablet 10 mg Daily PRN    carvediloL tablet 12.5 mg BID    dextromethorphan-guaiFENesin  mg/5 ml liquid 10 mL Q6H PRN    heparin (porcine) injection 4,000 Units PRN    HYDROcodone-acetaminophen 7.5-325 mg per tablet 1 tablet Q6H PRN    ondansetron injection 8 mg Q6H PRN    sevelamer carbonate tablet 800 mg TID WM    simethicone chewable tablet 80 mg TID PRN    sodium bicarbonate tablet 650 mg BID    traZODone tablet 50 mg Nightly PRN       Objective:     Vital Signs (Most Recent):  Temp: 98.3 °F (36.8 °C) (05/01/22 1100)  Pulse: 82 (05/01/22 1100)  Resp: 18 (05/01/22 1100)  BP: 106/75 (05/01/22 1100)  SpO2: 98 % (05/01/22 1100)  O2 Device (Oxygen Therapy): room air (05/01/22 1100) Vital Signs (24h Range):  Temp:  [97.6 °F (36.4 °C)-99 °F (37.2 °C)] 98.3 °F (36.8 °C)  Pulse:  [82-98] 82  Resp:  [17-20] 18  SpO2:  [96 %-99 %] 98 %  BP: (106-136)/(66-84) 106/75     Weight: 100 kg (220 lb 7.4 oz) (04/29/22 0000)  Body mass index is 32.56 kg/m².  Body surface area is 2.21 meters squared.    I/O last 3 completed shifts:  In: 360 [P.O.:360]  Out: -     Physical Exam Alert and no complaint. Chest  clear.    Significant Labs:sure  BMP:   Recent Labs   Lab 04/29/22  0353   GLU 96      K 3.8      CO2 27   BUN 43*   CREATININE 4.91*   CALCIUM 7.6*       Significant Imaging:  Labs: Reviewed    Assessment/Plan:     Active Diagnoses:    Diagnosis Date Noted POA    PRINCIPAL PROBLEM:  Acute kidney injury superimposed on chronic kidney disease [N17.9, N18.9] 04/24/2022 Yes    Acute thrombosis of cephalic vein [I82.619] 04/28/2022 Yes      Problems Resolved During this Admission:    Diagnosis Date Noted Date Resolved POA    TIA (transient ischemic attack) [G45.9] 04/24/2022 04/28/2022 Yes    Hyperkalemia [E87.5] 04/24/2022 04/30/2022 Yes       We'll continue to follow. Continue dialysis as scheduled    Thank you for your consult. I will follow-up with patient. Please contact us if you have any additional questions.    Santiago Dobson MD  Nephrology  [facility] - [address] [place] [address]

## 2022-05-01 NOTE — PLAN OF CARE
Problem: Adult Inpatient Plan of Care  Goal: Plan of Care Review  Outcome: Ongoing, Progressing  Goal: Patient-Specific Goal (Individualized)  Outcome: Ongoing, Progressing  Flowsheets (Taken 5/1/2022 6355)  Anxieties, Fears or Concerns: no anxieties voiced  Individualized Care Needs: placement in dialysis program  Patient-Specific Goals (Include Timeframe): enrollment in outpatient dialysis before d/c  Goal: Absence of Hospital-Acquired Illness or Injury  Outcome: Ongoing, Progressing  Goal: Optimal Comfort and Wellbeing  Outcome: Ongoing, Progressing  Goal: Readiness for Transition of Care  Outcome: Ongoing, Progressing     Problem: Fall Injury Risk  Goal: Absence of Fall and Fall-Related Injury  Outcome: Ongoing, Progressing     Problem: Fluid and Electrolyte Imbalance (Acute Kidney Injury/Impairment)  Goal: Fluid and Electrolyte Balance  Outcome: Ongoing, Progressing     Problem: Oral Intake Inadequate (Acute Kidney Injury/Impairment)  Goal: Optimal Nutrition Intake  Outcome: Ongoing, Progressing     Problem: Renal Function Impairment (Acute Kidney Injury/Impairment)  Goal: Effective Renal Function  Outcome: Ongoing, Progressing     Problem: Device-Related Complication Risk (Hemodialysis)  Goal: Safe, Effective Therapy Delivery  Outcome: Ongoing, Progressing     Problem: Hemodynamic Instability (Hemodialysis)  Goal: Effective Tissue Perfusion  Outcome: Ongoing, Progressing     Problem: Infection (Hemodialysis)  Goal: Absence of Infection Signs and Symptoms  Outcome: Ongoing, Progressing     Problem: Infection  Goal: Absence of Infection Signs and Symptoms  Outcome: Ongoing, Progressing  POC reviewed and continues

## 2022-05-02 VITALS
HEIGHT: 69 IN | SYSTOLIC BLOOD PRESSURE: 97 MMHG | DIASTOLIC BLOOD PRESSURE: 63 MMHG | TEMPERATURE: 98 F | WEIGHT: 220.44 LBS | HEART RATE: 83 BPM | OXYGEN SATURATION: 97 % | BODY MASS INDEX: 32.65 KG/M2 | RESPIRATION RATE: 18 BRPM

## 2022-05-02 PROCEDURE — 90935 HEMODIALYSIS ONE EVALUATION: CPT

## 2022-05-02 PROCEDURE — 25000003 PHARM REV CODE 250: Performed by: INTERNAL MEDICINE

## 2022-05-02 PROCEDURE — 99238 HOSP IP/OBS DSCHRG MGMT 30/<: CPT | Mod: ,,, | Performed by: FAMILY MEDICINE

## 2022-05-02 PROCEDURE — 99238 PR HOSPITAL DISCHARGE DAY,<30 MIN: ICD-10-PCS | Mod: ,,, | Performed by: FAMILY MEDICINE

## 2022-05-02 PROCEDURE — 63600175 PHARM REV CODE 636 W HCPCS: Performed by: INTERNAL MEDICINE

## 2022-05-02 RX ORDER — SEVELAMER CARBONATE 800 MG/1
800 TABLET, FILM COATED ORAL
Qty: 90 TABLET | Refills: 0 | Status: SHIPPED | OUTPATIENT
Start: 2022-05-02 | End: 2022-05-31 | Stop reason: SDUPTHER

## 2022-05-02 RX ORDER — SODIUM BICARBONATE 650 MG/1
650 TABLET ORAL 2 TIMES DAILY
Qty: 60 TABLET | Refills: 0 | Status: SHIPPED | OUTPATIENT
Start: 2022-05-02 | End: 2022-05-31 | Stop reason: SDUPTHER

## 2022-05-02 RX ORDER — ASPIRIN 81 MG/1
81 TABLET ORAL DAILY
Refills: 0
Start: 2022-05-02 | End: 2022-10-20

## 2022-05-02 RX ADMIN — SEVELAMER CARBONATE 800 MG: 800 TABLET, FILM COATED ORAL at 05:05

## 2022-05-02 RX ADMIN — SEVELAMER CARBONATE 800 MG: 800 TABLET, FILM COATED ORAL at 07:05

## 2022-05-02 RX ADMIN — HEPARIN SODIUM 4000 UNITS: 1000 INJECTION INTRAVENOUS; SUBCUTANEOUS at 04:05

## 2022-05-02 RX ADMIN — SODIUM CHLORIDE 2000 ML: 9 INJECTION, SOLUTION INTRAVENOUS at 04:05

## 2022-05-02 NOTE — ASSESSMENT & PLAN NOTE
Cephalic vein thrombosis.  This is a superficial vein but will plan 6 weeks of Eliquis 2.5 BID.     05/01--Discussed bleeding precautions with patient.

## 2022-05-02 NOTE — ASSESSMENT & PLAN NOTE
Gentle hydration  Stop diuretic  Continue BB but no nephrotoxic agents  Renal US  Will give 1 g mag and recheck all labs  Monitor on telemetry  Appreciate nephrology assistance.      4/25 - no improvement in creatinine. He is making urine.     4/26 - K remains an isssue, will retreat with kayexalate. Acidosis controlled with po bicarb.  Creatinine rising.              Consult surgery for tunneled cath tomorrow. Probable HD at Dr. Real's discretion.     4/27 - proceeding with HD    05/01--Dialyzed successfully x's 3 days this past week.  Will now dialyze three times a week per nephrology.  Scheduled for T, Th, Sat at [facility] in [place], [place].

## 2022-05-02 NOTE — DISCHARGE INSTRUCTIONS
Monitor blood pressure and heart rate daily, maintain a log and take readings with you to your hospital follow up appointments.

## 2022-05-02 NOTE — HOSPITAL COURSE
05/01--Pt doing well, no new issues or concerns.  Admitted to hospital after presenting to [facility] ED with right arm tingling, ensuing workup was notable for a potassium level of 7 along with worsening renal function.  Pt has history of PCKD and has been followed by nephrology during this admission.  Pt Received a right tunneled dialysis catheter on 04/27/2022 and has been successfully dialyzed x's 3 days, tolerated well.  Per Nephrology he can now begin dialysis three times a week.  Pt has requested to have dialysis in [facility] and has been scheduled to begin his T, Th, Sat schedule on tomorrow.  Pt also with a right cephalic vein thrombus that was found on vein mapping ultrasound, although this was superficial, pt was started on apixaban 2.5mg bid and will continue at discharge for a total of 6 weeks.  Discussed bleeding precautions. Pt will follow up with PCP in one week and will be followed by seen by nephrology during dialysis.  Has met the maximum benefit from this hospitalization and is stable for discharge.

## 2022-05-02 NOTE — DISCHARGE SUMMARY
[facility]  Discharge Summary      Patient Name: Catalina Stephens  MRN: [id number]  Patient Class: IP- Inpatient  Admission Date: 4/24/2022  Hospital Length of Stay: 8 days  Discharge Date and Time:  05/02/2022 2:52 PM  Attending Physician: Cali Dodd Jr., MD   Discharging Provider: KAYA Saldivar  Primary Care Provider: Ja Hernandez MD      HPI:   58 yo M presents to [facility] ED with right arm tingling for one hour prior to coming to the ED.  Patient says he wasjust sitting in his recliner and his wife became concerned and took him to the ED.  Patient had a stat head CT which showed proptosis and left frontal sinusitis.  He has no HA or vision change nor does he c/o sinus pressure.      His labs showed a K of 7 along with worsening renal function with BUN 73 and creat of 6.  Patient states he sees Dr. Cas Guardado for PCKD and that his father had PCKD as well.  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 Montaño after a C which did not show any obstructing CAD.  Patient states that he has not had any change in UOP but admits he has not followed with Dr. Guardado in quite some time.      Patient was given a Delmer's cocktail for hyperkalemia and EKG did not have peaked Twaves and initial troponin was normal.  Repeat K was 5.  CXR did show cardiomegally but no infiltrates.        Procedure(s) (LRB):  Insertion,catheter,tunneled (Right)      Hospital Course:   05/02--Pt doing well, no new issues or concerns.  Admitted to hospital after presenting to [facility] ED with right arm tingling, ensuing workup was notable for a potassium level of 7 along with worsening renal function.  Pt has history of PCKD and has been followed by nephrology during this admission.  Pt Received a right tunneled dialysis catheter on 04/27/2022 and has been successfully  dialyzed x's 3 days, tolerated well.  Per Nephrology he can now begin dialysis three times a week.  Pt has requested to have dialysis in [facility] and has been scheduled to begin his T, Th, Sat schedule on tomorrow.  Pt also with a right cephalic vein thrombus that was found on vein mapping ultrasound, although this was superficial, pt was started on apixaban 2.5mg bid and will continue at discharge for a total of 6 weeks.  Discussed bleeding precautions. Pt will follow up with PCP in one week and will be followed by seen by nephrology during dialysis.  Has met the maximum benefit from this hospitalization and is stable for discharge.           Goals of Care Treatment Preferences:  Code Status: Full Code      Consults:   Consults (From admission, onward)        Status Ordering Provider     Inpatient consult to Social Work  Once        Provider:  (Not yet assigned)    Completed KATE BRUNO JR     Inpatient consult to General Surgery  Once        Provider:  Zachary Draper MD    Completed KATE BRUNO JR     Inpatient consult to Nephrology  Once        Provider:  Petr Real MD    Completed GENI BUI          * Acute kidney injury superimposed on chronic kidney disease  Gentle hydration  Stop diuretic  Continue BB but no nephrotoxic agents  Renal US  Will give 1 g mag and recheck all labs  Monitor on telemetry  Appreciate nephrology assistance.      4/25 - no improvement in creatinine. He is making urine.     4/26 - K remains an isssue, will retreat with kayexalate. Acidosis controlled with po bicarb.  Creatinine rising.              Consult surgery for tunneled cath tomorrow. Probable HD at Dr. Real's discretion.     4/27 - proceeding with HD    05/02--Dialyzed successfully x's 3 days this past week.  Will now dialyze three times a week per nephrology.  [facility] [place] [place]     Acute thrombosis of cephalic vein    Cephalic vein thrombosis.  This is a superficial  vein but will plan 6 weeks of Eliquis 2.5 BID.     05/02--Discussed bleeding precautions with patient.      Final Active Diagnoses:    Diagnosis Date Noted POA    PRINCIPAL PROBLEM:  Acute kidney injury superimposed on chronic kidney disease [N17.9, N18.9] 04/24/2022 Yes    Acute thrombosis of cephalic vein [I82.619] 04/28/2022 Yes      Problems Resolved During this Admission:    Diagnosis Date Noted Date Resolved POA    TIA (transient ischemic attack) [G45.9] 04/24/2022 04/28/2022 Yes    Hyperkalemia [E87.5] 04/24/2022 04/30/2022 Yes       Discharged Condition: stable    Disposition: Home or Self Care    Follow Up:   Follow-up Information     Ja Hernandez MD Follow up on 5/9/2022.    Specialty: Family Medicine  Why: Hospital follow up; 9:40 am  Contact information:  [address] [facility] [id number] [phone]             [facility] Follow up in 1 day(s).    Why: Report to the center tomorrow at 0900  Doctor to round during dialysis  Contact information:  [address], [place]  [place] [address]  [phone]                     Patient Instructions:      Diet renal     Notify your health care provider if you experience any of the following:  temperature >100.4     Notify your health care provider if you experience any of the following:  persistent nausea and vomiting or diarrhea     Notify your health care provider if you experience any of the following:  severe uncontrolled pain     Notify your health care provider if you experience any of the following:  redness, tenderness, or signs of infection (pain, swelling, redness, odor or green/yellow discharge around incision site)     Notify your health care provider if you experience any of the following:  difficulty breathing or increased cough     Notify your health care provider if you experience any of the following:  severe persistent headache     Notify your health care provider if you  experience any of the following:  worsening rash     Notify your health care provider if you experience any of the following:  persistent dizziness, light-headedness, or visual disturbances     Notify your health care provider if you experience any of the following:  increased confusion or weakness     Activity as tolerated       Significant Diagnostic Studies: Labs: BMP: No results for input(s): GLU, NA, K, CL, CO2, BUN, CREATININE, CALCIUM, MG in the last 48 hours. and CBC No results for input(s): WBC, HGB, HCT, PLT in the last 48 hours.    Pending Diagnostic Studies:     Procedure Component Value Units Date/Time    EXTRA TUBES [[id number]]     Order Status: Sent Lab Status: No result     Specimen: Blood, Venous     Narrative:      The following orders were created for panel order EXTRA TUBES.  Procedure                               Abnormality         Status                     ---------                               -----------         ------                     Lavender Top Hold[[id number]]                                                             Please view results for these tests on the individual orders.    Lavender Top Hold [[id number]]     Order Status: Sent Lab Status: No result     Specimen: Blood, Venous          Medications:  Reconciled Home Medications:      Medication List      START taking these medications    apixaban 2.5 mg Tab  Commonly known as: ELIQUIS  Take 1 tablet (2.5 mg total) by mouth 2 (two) times daily.     aspirin 81 MG EC tablet  Commonly known as: ECOTRIN  Take 1 tablet (81 mg total) by mouth once daily.     sevelamer carbonate 800 mg Tab  Commonly known as: RENVELA  Take 1 tablet (800 mg total) by mouth 3 (three) times daily with meals.     sodium bicarbonate 650 MG tablet  Take 1 tablet (650 mg total) by mouth 2 (two) times daily.        CONTINUE taking these medications    albuterol 90 mcg/actuation inhaler  Commonly known as: PROVENTIL/VENTOLIN HFA  Inhale 2 puffs into the lungs  every 6 (six) hours as needed for Wheezing. Rescue     carvediloL 12.5 MG tablet  Commonly known as: COREG  Take 1 tablet (12.5 mg total) by mouth 2 (two) times daily.        STOP taking these medications    spironolactone 25 MG tablet  Commonly known as: ALDACTONE            Indwelling Lines/Drains at time of discharge:   Lines/Drains/Airways     Central Venous Catheter Line  Duration                Hemodialysis Catheter 04/27/22 right subclavian 5 days                Time spent on the discharge of patient: >30 minutes         KAYA Saldivar  Department of Hospital Medicine  [address]

## 2022-05-02 NOTE — NURSING
Patient returned from dialysis, no distress noted. Discharge instructions given, waiting for wife to pick him up

## 2022-05-02 NOTE — PLAN OF CARE
[facility] Telemetry  Discharge Final Note    Primary Care Provider: Ja Hernandez MD    Expected Discharge Date: 5/2/2022    Final Discharge Note (most recent)     Final Note - 05/02/22 1351        Final Note    Assessment Type Final Discharge Note     Anticipated Discharge Disposition Home or Self Care     Hospital Resources/Appts/Education Provided Dilaysis schedule provided        Post-Acute Status    Post-Acute Authorization Dialysis     Diaylsis Status Set-up Complete/Auth obtained     Discharge Delays None known at this time             dialysis approved for t-t-s at 9:00 am in [place]. Pt informed and given schedule. josie Fox informed.    Important Message from Medicare             Contact Info     Ja Hernandez MD   Specialty: Family Medicine   Relationship: PCP - General    [address] [place] [address]   Phone: [phone]       Next Steps: Follow up in 1 week(s)    Instructions: Hospital follow up    [facility]    [address], [place] [address]  [phone]       Next Steps: Follow up in 1 day(s)    Instructions: Report to the center tomorrow at 0900  Doctor to round during dialysis

## 2022-05-02 NOTE — DIALYSIS ROUNDING
The patient was dialyzed today without complication.He has no complaints toda  PE  Lungs-clear  Cor-no rub  Abd-soft  Plan   Continue the present care

## 2022-05-03 NOTE — NURSING
Pt dc per wc via personal vehicle enroute to home with all discharge instructions and personal belongings, voiced understanding, iv removed with cath intact, bleed ceased, covered with drsg and secured with tape, pt does have rt chest wall hd cath intact, no s/s of infection noted, drsg clean, dry and intact

## 2022-05-10 ENCOUNTER — OFFICE VISIT (OUTPATIENT)
Dept: FAMILY MEDICINE | Facility: CLINIC | Age: 57
End: 2022-05-10
Payer: COMMERCIAL

## 2022-05-10 VITALS
WEIGHT: 213.63 LBS | OXYGEN SATURATION: 99 % | HEART RATE: 69 BPM | HEIGHT: 69 IN | BODY MASS INDEX: 31.64 KG/M2 | SYSTOLIC BLOOD PRESSURE: 108 MMHG | TEMPERATURE: 99 F | DIASTOLIC BLOOD PRESSURE: 74 MMHG

## 2022-05-10 DIAGNOSIS — I82.619 CEPHALIC VEIN THROMBOSIS: ICD-10-CM

## 2022-05-10 DIAGNOSIS — I82.619: Primary | ICD-10-CM

## 2022-05-10 DIAGNOSIS — E87.5 HYPERKALEMIA: ICD-10-CM

## 2022-05-10 DIAGNOSIS — N28.9 RENAL DISEASE: ICD-10-CM

## 2022-05-10 DIAGNOSIS — I10 HYPERTENSION, UNSPECIFIED TYPE: ICD-10-CM

## 2022-05-10 DIAGNOSIS — G45.9 TIA (TRANSIENT ISCHEMIC ATTACK): ICD-10-CM

## 2022-05-10 LAB
ANION GAP SERPL CALCULATED.3IONS-SCNC: 12 MMOL/L (ref 7–16)
BASOPHILS # BLD AUTO: 0.02 K/UL (ref 0–0.2)
BASOPHILS NFR BLD AUTO: 0.4 % (ref 0–1)
BUN SERPL-MCNC: 34 MG/DL (ref 7–18)
BUN/CREAT SERPL: 6 (ref 6–20)
CALCIUM SERPL-MCNC: 8.2 MG/DL (ref 8.5–10.1)
CHLORIDE SERPL-SCNC: 98 MMOL/L (ref 98–107)
CO2 SERPL-SCNC: 31 MMOL/L (ref 21–32)
CREAT SERPL-MCNC: 5.67 MG/DL (ref 0.7–1.3)
DIFFERENTIAL METHOD BLD: ABNORMAL
EOSINOPHIL # BLD AUTO: 0.22 K/UL (ref 0–0.5)
EOSINOPHIL NFR BLD AUTO: 4.7 % (ref 1–4)
ERYTHROCYTE [DISTWIDTH] IN BLOOD BY AUTOMATED COUNT: 13.6 % (ref 11.5–14.5)
GLUCOSE SERPL-MCNC: 79 MG/DL (ref 74–106)
HCT VFR BLD AUTO: 41.9 % (ref 40–54)
HGB BLD-MCNC: 12.8 G/DL (ref 13.5–18)
IMM GRANULOCYTES # BLD AUTO: 0 K/UL (ref 0–0.04)
IMM GRANULOCYTES NFR BLD: 0 % (ref 0–0.4)
LYMPHOCYTES # BLD AUTO: 0.79 K/UL (ref 1–4.8)
LYMPHOCYTES NFR BLD AUTO: 16.8 % (ref 27–41)
MCH RBC QN AUTO: 28 PG (ref 27–31)
MCHC RBC AUTO-ENTMCNC: 30.5 G/DL (ref 32–36)
MCV RBC AUTO: 91.7 FL (ref 80–96)
MONOCYTES # BLD AUTO: 0.38 K/UL (ref 0–0.8)
MONOCYTES NFR BLD AUTO: 8.1 % (ref 2–6)
MPC BLD CALC-MCNC: 11.9 FL (ref 9.4–12.4)
NEUTROPHILS # BLD AUTO: 3.28 K/UL (ref 1.8–7.7)
NEUTROPHILS NFR BLD AUTO: 70 % (ref 53–65)
NRBC # BLD AUTO: 0 X10E3/UL
NRBC, AUTO (.00): 0 %
PLATELET # BLD AUTO: 186 K/UL (ref 150–400)
POTASSIUM SERPL-SCNC: 4.5 MMOL/L (ref 3.5–5.1)
RBC # BLD AUTO: 4.57 M/UL (ref 4.6–6.2)
SODIUM SERPL-SCNC: 136 MMOL/L (ref 136–145)
WBC # BLD AUTO: 4.69 K/UL (ref 4.5–11)

## 2022-05-10 PROCEDURE — 80048 BASIC METABOLIC PANEL: ICD-10-PCS | Mod: ,,, | Performed by: CLINICAL MEDICAL LABORATORY

## 2022-05-10 PROCEDURE — 1111F PR DISCHARGE MEDS RECONCILED W/ CURRENT OUTPATIENT MED LIST: ICD-10-PCS | Mod: CPTII,,, | Performed by: FAMILY MEDICINE

## 2022-05-10 PROCEDURE — 1159F MED LIST DOCD IN RCRD: CPT | Mod: CPTII,,, | Performed by: FAMILY MEDICINE

## 2022-05-10 PROCEDURE — 3078F PR MOST RECENT DIASTOLIC BLOOD PRESSURE < 80 MM HG: ICD-10-PCS | Mod: CPTII,,, | Performed by: FAMILY MEDICINE

## 2022-05-10 PROCEDURE — 3066F NEPHROPATHY DOC TX: CPT | Mod: CPTII,,, | Performed by: FAMILY MEDICINE

## 2022-05-10 PROCEDURE — 99214 OFFICE O/P EST MOD 30 MIN: CPT | Mod: ,,, | Performed by: FAMILY MEDICINE

## 2022-05-10 PROCEDURE — 80048 BASIC METABOLIC PNL TOTAL CA: CPT | Mod: ,,, | Performed by: CLINICAL MEDICAL LABORATORY

## 2022-05-10 PROCEDURE — 85025 CBC WITH DIFFERENTIAL: ICD-10-PCS | Mod: ,,, | Performed by: CLINICAL MEDICAL LABORATORY

## 2022-05-10 PROCEDURE — 3066F PR DOCUMENTATION OF TREATMENT FOR NEPHROPATHY: ICD-10-PCS | Mod: CPTII,,, | Performed by: FAMILY MEDICINE

## 2022-05-10 PROCEDURE — 3008F BODY MASS INDEX DOCD: CPT | Mod: CPTII,,, | Performed by: FAMILY MEDICINE

## 2022-05-10 PROCEDURE — 99214 PR OFFICE/OUTPT VISIT, EST, LEVL IV, 30-39 MIN: ICD-10-PCS | Mod: ,,, | Performed by: FAMILY MEDICINE

## 2022-05-10 PROCEDURE — 3078F DIAST BP <80 MM HG: CPT | Mod: CPTII,,, | Performed by: FAMILY MEDICINE

## 2022-05-10 PROCEDURE — 1111F DSCHRG MED/CURRENT MED MERGE: CPT | Mod: CPTII,,, | Performed by: FAMILY MEDICINE

## 2022-05-10 PROCEDURE — 3074F PR MOST RECENT SYSTOLIC BLOOD PRESSURE < 130 MM HG: ICD-10-PCS | Mod: CPTII,,, | Performed by: FAMILY MEDICINE

## 2022-05-10 PROCEDURE — 85025 COMPLETE CBC W/AUTO DIFF WBC: CPT | Mod: ,,, | Performed by: CLINICAL MEDICAL LABORATORY

## 2022-05-10 PROCEDURE — 3074F SYST BP LT 130 MM HG: CPT | Mod: CPTII,,, | Performed by: FAMILY MEDICINE

## 2022-05-10 PROCEDURE — 3008F PR BODY MASS INDEX (BMI) DOCUMENTED: ICD-10-PCS | Mod: CPTII,,, | Performed by: FAMILY MEDICINE

## 2022-05-10 PROCEDURE — 1159F PR MEDICATION LIST DOCUMENTED IN MEDICAL RECORD: ICD-10-PCS | Mod: CPTII,,, | Performed by: FAMILY MEDICINE

## 2022-05-10 RX ORDER — TOBRAMYCIN AND DEXAMETHASONE 3; 1 MG/ML; MG/ML
1 SUSPENSION/ DROPS OPHTHALMIC EVERY 6 HOURS
Qty: 2.5 ML | Refills: 0 | Status: SHIPPED | OUTPATIENT
Start: 2022-05-10 | End: 2022-07-21 | Stop reason: ALTCHOICE

## 2022-05-10 NOTE — PROGRESS NOTES
Ja Hernandez MD   [facility]  [address] [place] [address]     PATIENT NAME: Catalina Stephens  : 1965  DATE: 5/10/22  MRN: [id number]      Billing Provider: Ja Hernandez MD  Level of Service: [place] OFFICE/OUTPT VISIT, EST, LEVL IV, 30-39 MIN  Patient PCP Information     Provider PCP Type    Ja Hernandez MD General          Reason for Visit / Chief Complaint: Follow-up (ER Visit at [facility] on 22-numbness to right arm. Patient transferred to [facility] after elevated K+. Dx Acute Kidney Injury Superimposed on Chronic Kidney Disease, TIA, Hyperkalemia, Acute Thrombosis of Cephalic Vein. Temporary Dialysis Cath inserted and started Dialysis on 4/27/22 x 3 days. Patient now taking Dialysis on 3 x week in [place], [place]. ), Deep Vein Thrombosis (Acute thrombosis of Cephalic Vein and started on Eliquis 2.5mg BID. Insurance not paying. Suggesting Xarelto. Reviewed and reconciled medications with discharge medications. Multiple new medications started-Xarelto, aspirin, Sevelamer, Sodium bicarbonate and stopped Aldactone. ), and Eye Problem (Check left eye. Redness to eye. )         History of Present Illness / Problem Focused Workflow     Catalina Stephens presents to the clinic with Follow-up (ER Visit at [facility] on 22-numbness to right arm. Patient transferred to [facility] after elevated K+. Dx Acute Kidney Injury Superimposed on Chronic Kidney Disease, TIA, Hyperkalemia, Acute Thrombosis of Cephalic Vein. Temporary Dialysis Cath inserted and started Dialysis on 4/27/22 x 3 days. Patient now taking Dialysis on 3 x week in [place], [place]. ), Deep Vein Thrombosis (Acute thrombosis of Cephalic Vein and started on Eliquis 2.5mg BID. Insurance not paying. Suggesting Xarelto. Reviewed and reconciled medications with discharge medications. Multiple new medications started-Xarelto, aspirin, Sevelamer, Sodium bicarbonate and stopped  Aldactone. ), and Eye Problem (Check left eye. Redness to eye. )     HPI    Review of Systems     Review of Systems   Constitutional: Positive for fatigue. Negative for activity change, appetite change and fever.   HENT: Negative for nasal congestion, ear pain, hearing loss, sinus pressure/congestion and sore throat.    Respiratory: Negative for cough, chest tightness and shortness of breath.    Cardiovascular: Negative for chest pain and palpitations.   Gastrointestinal: Negative for abdominal pain and fecal incontinence.   Genitourinary: Negative for bladder incontinence, difficulty urinating and erectile dysfunction.   Musculoskeletal: Positive for myalgias. Negative for arthralgias.   Integumentary:  Negative for rash.   Neurological: Positive for weakness, light-headedness and numbness. Negative for dizziness and headaches.        Medical / Social / Family History     Past Medical History:   Diagnosis Date    CHF (congestive heart failure)     Chronic systolic (congestive) heart failure     Hypertension     Mild intermittent asthma, uncomplicated     Polycystic kidney disease        Past Surgical History:   Procedure Laterality Date    CENTRAL VENOUS CATHETER INSERTION  04/27/2022    Right subclavian dialysis catheter    LEFT HEART CATHETERIZATION         Social History  Catalina Stephens  reports that he has never smoked. He has never used smokeless tobacco. He reports previous alcohol use. He reports that he does not use drugs.    Family History  Catalina Stephens  family history includes Heart attack in his father; Heart disease in his father; Kidney disease in his father; No Known Problems in his mother; Stroke in his maternal grandfather.    Medications and Allergies     Medications  Outpatient Medications Marked as Taking for the 5/10/22 encounter (Office Visit) with Ja Hernandez MD   Medication Sig Dispense Refill    albuterol (PROVENTIL/VENTOLIN HFA) 90 mcg/actuation inhaler Inhale 2 puffs  into the lungs every 6 (six) hours as needed for Wheezing. Rescue 18 g 2    aspirin (ECOTRIN) 81 MG EC tablet Take 1 tablet (81 mg total) by mouth once daily.  0    carvediloL (COREG) 12.5 MG tablet Take 1 tablet (12.5 mg total) by mouth 2 (two) times daily. 180 tablet 1    sevelamer carbonate (RENVELA) 800 mg Tab Take 1 tablet (800 mg total) by mouth 3 (three) times daily with meals. 90 tablet 0    sodium bicarbonate 650 MG tablet Take 1 tablet (650 mg total) by mouth 2 (two) times daily. 60 tablet 0    [DISCONTINUED] apixaban (ELIQUIS) 2.5 mg Tab Take 1 tablet (2.5 mg total) by mouth 2 (two) times daily. 28 tablet 0       Allergies  Review of patient's allergies indicates:   Allergen Reactions    Ace inhibitors Swelling    Iodine and iodide containing products Nausea And Vomiting     SHRIMP       Physical Examination     Vitals:    05/10/22 0741   BP: 108/74   Pulse: 69   Temp: 99.1 °F (37.3 °C)     Physical Exam     Assessment and Plan (including Health Maintenance)   :    Plan:         Health Maintenance Due   Topic Date Due    COVID-19 Vaccine (1) Never done    HIV Screening  Never done    TETANUS VACCINE  Never done    High Dose Statin  Never done    Colorectal Cancer Screening  Never done    Shingles Vaccine (1 of 2) Never done       Problem List Items Addressed This Visit        Neuro    TIA (transient ischemic attack)       Cardiac/Vascular    Hypertension    Relevant Orders    CBC Auto Differential (Completed)    Basic Metabolic Panel (Completed)       Renal/    Hyperkalemia    Renal disease       Hematology    Acute thrombosis of cephalic vein - Primary    Relevant Orders    CBC Auto Differential (Completed)    Basic Metabolic Panel (Completed)      Other Visit Diagnoses     Cephalic vein thrombosis            Acute thrombosis of cephalic vein, unspecified laterality  -     CBC Auto Differential; Future; Expected date: 05/10/2022  -     Basic Metabolic Panel; Future; Expected date:  05/10/2022    Hypertension, unspecified type  -     CBC Auto Differential; Future; Expected date: 05/10/2022  -     Basic Metabolic Panel; Future; Expected date: 05/10/2022    TIA (transient ischemic attack)    Cephalic vein thrombosis    Renal disease    Hyperkalemia    Other orders  -     rivaroxaban (XARELTO) 20 mg Tab; Take 1 tablet (20 mg total) by mouth daily with dinner or evening meal.  Dispense: 30 tablet; Refill: 5  -     tobramycin-dexamethasone 0.3-0.1% (TOBRADEX) 0.3-0.1 % DrpS; Place 1 drop into the left eye every 6 (six) hours.  Dispense: 2.5 mL; Refill: 0       Health Maintenance Topics with due status: Not Due       Topic Last Completion Date    Lipid Panel 04/24/2022    Influenza Vaccine Not Due       Procedures     No future appointments.     Follow up in about 4 weeks (around 6/7/2022).       Signature:  Ja Hernandez MD  [facility]  [address] [place] [address]  [phone] Phone  [phone] Fax    Date of encounter: 5/10/22

## 2022-05-24 PROBLEM — I10 HYPERTENSION: Status: ACTIVE | Noted: 2022-05-24

## 2022-05-24 PROBLEM — N28.9 RENAL DISEASE: Status: ACTIVE | Noted: 2022-05-24

## 2022-05-31 RX ORDER — SODIUM BICARBONATE 650 MG/1
650 TABLET ORAL 2 TIMES DAILY
Qty: 60 TABLET | Refills: 5 | Status: SHIPPED | OUTPATIENT
Start: 2022-05-31 | End: 2023-08-17

## 2022-05-31 RX ORDER — SEVELAMER CARBONATE 800 MG/1
800 TABLET, FILM COATED ORAL
Qty: 90 TABLET | Refills: 5 | Status: SHIPPED | OUTPATIENT
Start: 2022-05-31 | End: 2022-10-03

## 2022-05-31 NOTE — TELEPHONE ENCOUNTER
----- Message from Selena Figueroa sent at 5/31/2022 10:18 AM CDT -----  Pt is requesting refills on Renvela 800mg and Sodium bicarb 650mg tob e sent to [facility] in [place]

## 2022-07-21 ENCOUNTER — OFFICE VISIT (OUTPATIENT)
Dept: FAMILY MEDICINE | Facility: CLINIC | Age: 57
End: 2022-07-21
Payer: COMMERCIAL

## 2022-07-21 VITALS
WEIGHT: 207.19 LBS | OXYGEN SATURATION: 99 % | TEMPERATURE: 98 F | BODY MASS INDEX: 30.69 KG/M2 | SYSTOLIC BLOOD PRESSURE: 110 MMHG | HEIGHT: 69 IN | HEART RATE: 82 BPM | DIASTOLIC BLOOD PRESSURE: 74 MMHG

## 2022-07-21 DIAGNOSIS — G45.9 TIA (TRANSIENT ISCHEMIC ATTACK): ICD-10-CM

## 2022-07-21 DIAGNOSIS — I82.619: Primary | ICD-10-CM

## 2022-07-21 DIAGNOSIS — N28.9 RENAL DISEASE: ICD-10-CM

## 2022-07-21 PROCEDURE — 99214 PR OFFICE/OUTPT VISIT, EST, LEVL IV, 30-39 MIN: ICD-10-PCS | Mod: ,,, | Performed by: FAMILY MEDICINE

## 2022-07-21 PROCEDURE — 3066F PR DOCUMENTATION OF TREATMENT FOR NEPHROPATHY: ICD-10-PCS | Mod: CPTII,,, | Performed by: FAMILY MEDICINE

## 2022-07-21 PROCEDURE — 3066F NEPHROPATHY DOC TX: CPT | Mod: CPTII,,, | Performed by: FAMILY MEDICINE

## 2022-07-21 PROCEDURE — 1159F MED LIST DOCD IN RCRD: CPT | Mod: CPTII,,, | Performed by: FAMILY MEDICINE

## 2022-07-21 PROCEDURE — 3008F BODY MASS INDEX DOCD: CPT | Mod: CPTII,,, | Performed by: FAMILY MEDICINE

## 2022-07-21 PROCEDURE — 3008F PR BODY MASS INDEX (BMI) DOCUMENTED: ICD-10-PCS | Mod: CPTII,,, | Performed by: FAMILY MEDICINE

## 2022-07-21 PROCEDURE — 99214 OFFICE O/P EST MOD 30 MIN: CPT | Mod: ,,, | Performed by: FAMILY MEDICINE

## 2022-07-21 PROCEDURE — 3074F PR MOST RECENT SYSTOLIC BLOOD PRESSURE < 130 MM HG: ICD-10-PCS | Mod: CPTII,,, | Performed by: FAMILY MEDICINE

## 2022-07-21 PROCEDURE — 1159F PR MEDICATION LIST DOCUMENTED IN MEDICAL RECORD: ICD-10-PCS | Mod: CPTII,,, | Performed by: FAMILY MEDICINE

## 2022-07-21 PROCEDURE — 3078F DIAST BP <80 MM HG: CPT | Mod: CPTII,,, | Performed by: FAMILY MEDICINE

## 2022-07-21 PROCEDURE — 3074F SYST BP LT 130 MM HG: CPT | Mod: CPTII,,, | Performed by: FAMILY MEDICINE

## 2022-07-21 PROCEDURE — 3078F PR MOST RECENT DIASTOLIC BLOOD PRESSURE < 80 MM HG: ICD-10-PCS | Mod: CPTII,,, | Performed by: FAMILY MEDICINE

## 2022-07-21 NOTE — PROGRESS NOTES
Ja Hernandez MD   [facility]  [address] [place] [address]     PATIENT NAME: Catalina Stephens  : 1965  DATE: 22  MRN: [id number]      Billing Provider: Ja Hernandez MD  Level of Service: [place] OFFICE/OUTPT VISIT, EST, LEVL IV, 30-39 MIN  Patient PCP Information     Provider PCP Type    Ja Hernandez MD General          Reason for Visit / Chief Complaint: Other Misc ([place] Paperwork. Dialysis on M, W, and F. )         History of Present Illness / Problem Focused Workflow     Catalina Stephens presents to the clinic with Other Misc ([facility] Paperwork. Dialysis on M, W, and F. )     HPI    Review of Systems     Review of Systems   Constitutional: Negative for activity change, appetite change, fatigue and fever.   HENT: Negative for nasal congestion, ear pain, hearing loss, sinus pressure/congestion and sore throat.    Respiratory: Negative for cough, chest tightness and shortness of breath.    Cardiovascular: Negative for chest pain and palpitations.   Gastrointestinal: Negative for abdominal pain and fecal incontinence.   Genitourinary: Negative for bladder incontinence, difficulty urinating and erectile dysfunction.   Musculoskeletal: Negative for arthralgias.   Integumentary:  Negative for rash.   Neurological: Negative for dizziness and headaches.        Medical / Social / Family History     Past Medical History:   Diagnosis Date    CHF (congestive heart failure)     Chronic systolic (congestive) heart failure     Hypertension     Mild intermittent asthma, uncomplicated     Polycystic kidney disease        Past Surgical History:   Procedure Laterality Date    CENTRAL VENOUS CATHETER INSERTION  2022    Right subclavian dialysis catheter    Left AV Graft  2022    Raymundo New DO    LEFT HEART CATHETERIZATION         Social History  Catalina Stephens  reports that he has never smoked. He has never used smokeless tobacco. He reports previous  alcohol use. He reports that he does not use drugs.    Family History  Catalina Stephens  family history includes Heart attack in his father; Heart disease in his father; Kidney disease in his father; No Known Problems in his mother; Stroke in his maternal grandfather.    Medications and Allergies     Medications  Outpatient Medications Marked as Taking for the 7/21/22 encounter (Office Visit) with Ja Hernandez MD   Medication Sig Dispense Refill    albuterol (PROVENTIL/VENTOLIN HFA) 90 mcg/actuation inhaler Inhale 2 puffs into the lungs every 6 (six) hours as needed for Wheezing. Rescue 18 g 2    apixaban (ELIQUIS) 2.5 mg Tab Take 1 tablet (2.5 mg total) by mouth 2 (two) times daily. 60 tablet 5    carvediloL (COREG) 12.5 MG tablet TAKE 1 TABLET BY MOUTH 2 TIMES DAILY. 180 tablet 1    sevelamer carbonate (RENVELA) 800 mg Tab Take 1 tablet (800 mg total) by mouth 3 (three) times daily with meals. 90 tablet 5    sodium bicarbonate 650 MG tablet Take 1 tablet (650 mg total) by mouth 2 (two) times daily. 60 tablet 5       Allergies  Review of patient's allergies indicates:   Allergen Reactions    Ace inhibitors Swelling    Iodine and iodide containing products Nausea And Vomiting     SHRIMP       Physical Examination     Vitals:    07/21/22 1324   BP: 110/74   Pulse: 82   Temp: 98 °F (36.7 °C)     Physical Exam  Constitutional:       General: He is not in acute distress.     Appearance: He is not ill-appearing.   HENT:      Head: Normocephalic and atraumatic.      Right Ear: Tympanic membrane and ear canal normal.      Left Ear: Tympanic membrane and ear canal normal.      Nose: Nose normal. No congestion or rhinorrhea.   Eyes:      Pupils: Pupils are equal, round, and reactive to light.   Cardiovascular:      Rate and Rhythm: Normal rate and regular rhythm.      Pulses: Normal pulses.      Heart sounds: No murmur heard.  Pulmonary:      Effort: No respiratory distress.      Breath sounds: No wheezing,  rhonchi or rales.   Abdominal:      General: Bowel sounds are normal.      Palpations: Abdomen is soft.      Tenderness: There is no abdominal tenderness.      Hernia: No hernia is present.   Musculoskeletal:      Cervical back: Normal range of motion and neck supple.   Lymphadenopathy:      Cervical: No cervical adenopathy.   Skin:     General: Skin is warm and dry.   Neurological:      Mental Status: He is alert.   Psychiatric:         Behavior: Behavior normal.         Thought Content: Thought content normal.          Assessment and Plan (including Health Maintenance)   :    Plan:         Health Maintenance Due   Topic Date Due    COVID-19 Vaccine (1) Never done    HIV Screening  Never done    TETANUS VACCINE  Never done    High Dose Statin  Never done    Colorectal Cancer Screening  Never done    Shingles Vaccine (1 of 2) Never done       Problem List Items Addressed This Visit        Neuro    TIA (transient ischemic attack)       Renal/    Renal disease       Hematology    Acute thrombosis of cephalic vein - Primary        Acute thrombosis of cephalic vein, unspecified laterality    TIA (transient ischemic attack)    Renal disease       Health Maintenance Topics with due status: Not Due       Topic Last Completion Date    Lipid Panel 04/24/2022    Influenza Vaccine Not Due       Procedures     No future appointments.     No follow-ups on file.       Signature:  Ja Hernandez MD  [facility]  [address] [place] [address]  [phone] Phone  [phone] Fax    Date of encounter: 7/21/22

## 2022-10-20 ENCOUNTER — OFFICE VISIT (OUTPATIENT)
Dept: FAMILY MEDICINE | Facility: CLINIC | Age: 57
End: 2022-10-20
Payer: COMMERCIAL

## 2022-10-20 VITALS
HEART RATE: 83 BPM | SYSTOLIC BLOOD PRESSURE: 106 MMHG | BODY MASS INDEX: 30.54 KG/M2 | OXYGEN SATURATION: 99 % | TEMPERATURE: 99 F | DIASTOLIC BLOOD PRESSURE: 70 MMHG | HEIGHT: 69 IN | WEIGHT: 206.19 LBS

## 2022-10-20 DIAGNOSIS — Z99.2 ESRD (END STAGE RENAL DISEASE) ON DIALYSIS: ICD-10-CM

## 2022-10-20 DIAGNOSIS — N28.9 RENAL DISEASE: Primary | ICD-10-CM

## 2022-10-20 DIAGNOSIS — N18.6 ESRD (END STAGE RENAL DISEASE) ON DIALYSIS: ICD-10-CM

## 2022-10-20 PROCEDURE — 1159F MED LIST DOCD IN RCRD: CPT | Mod: CPTII,,, | Performed by: FAMILY MEDICINE

## 2022-10-20 PROCEDURE — 3074F PR MOST RECENT SYSTOLIC BLOOD PRESSURE < 130 MM HG: ICD-10-PCS | Mod: CPTII,,, | Performed by: FAMILY MEDICINE

## 2022-10-20 PROCEDURE — 3066F PR DOCUMENTATION OF TREATMENT FOR NEPHROPATHY: ICD-10-PCS | Mod: CPTII,,, | Performed by: FAMILY MEDICINE

## 2022-10-20 PROCEDURE — 3066F NEPHROPATHY DOC TX: CPT | Mod: CPTII,,, | Performed by: FAMILY MEDICINE

## 2022-10-20 PROCEDURE — 1159F PR MEDICATION LIST DOCUMENTED IN MEDICAL RECORD: ICD-10-PCS | Mod: CPTII,,, | Performed by: FAMILY MEDICINE

## 2022-10-20 PROCEDURE — 99214 PR OFFICE/OUTPT VISIT, EST, LEVL IV, 30-39 MIN: ICD-10-PCS | Mod: ,,, | Performed by: FAMILY MEDICINE

## 2022-10-20 PROCEDURE — 99214 OFFICE O/P EST MOD 30 MIN: CPT | Mod: ,,, | Performed by: FAMILY MEDICINE

## 2022-10-20 PROCEDURE — 3078F PR MOST RECENT DIASTOLIC BLOOD PRESSURE < 80 MM HG: ICD-10-PCS | Mod: CPTII,,, | Performed by: FAMILY MEDICINE

## 2022-10-20 PROCEDURE — 3074F SYST BP LT 130 MM HG: CPT | Mod: CPTII,,, | Performed by: FAMILY MEDICINE

## 2022-10-20 PROCEDURE — 3078F DIAST BP <80 MM HG: CPT | Mod: CPTII,,, | Performed by: FAMILY MEDICINE

## 2022-10-20 RX ORDER — LIDOCAINE AND PRILOCAINE 25; 25 MG/G; MG/G
CREAM TOPICAL
COMMUNITY
Start: 2022-09-26

## 2022-10-20 NOTE — PROGRESS NOTES
Ja Hernandez MD   [facility]  [address] [place] [address]     PATIENT NAME: Catalina Stephens  : 1965  DATE: 10/20/22  MRN: [id number]      Billing Provider: Ja Hernandez MD  Level of Service:   Patient PCP Information       Provider PCP Type    Ja Hernandez MD General            Reason for Visit / Chief Complaint: Chronic Kidney Disease ([place] Request for Medical Information to Support Disability paperwork. Apt with Social Security Administration for End Stage Renal Disease Disability on 11/15/2022 via telephone. )         History of Present Illness / Problem Focused Workflow     Catalina Stephens presents to the clinic with Chronic Kidney Disease ([employer] Request for Medical Information to Support Disability paperwork. Apt with Social Security Administration for End Stage Renal Disease Disability on 11/15/2022 via telephone. )     HPI    Review of Systems     Review of Systems   Constitutional:  Negative for activity change, appetite change, fatigue and fever.   HENT:  Negative for nasal congestion, ear pain, hearing loss, sinus pressure/congestion and sore throat.    Respiratory:  Negative for cough, chest tightness and shortness of breath.    Cardiovascular:  Negative for chest pain and palpitations.   Gastrointestinal:  Negative for abdominal pain and fecal incontinence.   Genitourinary:  Negative for bladder incontinence, difficulty urinating and erectile dysfunction.   Musculoskeletal:  Negative for arthralgias.   Integumentary:  Negative for rash.   Neurological:  Negative for dizziness and headaches.      Medical / Social / Family History     Past Medical History:   Diagnosis Date    CHF (congestive heart failure)     Chronic systolic (congestive) heart failure     Hypertension     Mild intermittent asthma, uncomplicated     Polycystic kidney disease        Past Surgical History:   Procedure Laterality Date    CENTRAL VENOUS CATHETER INSERTION   04/27/2022    Right subclavian dialysis catheter    Left AV Graft  06/16/2022    Raymundo New, DO    LEFT HEART CATHETERIZATION         Social History  Catalina Stephens  reports that he has never smoked. He has never used smokeless tobacco. He reports that he does not currently use alcohol. He reports that he does not use drugs.    Family History  Catalina Stephens  family history includes Heart attack in his father; Heart disease in his father; Kidney disease in his father; No Known Problems in his mother; Stroke in his maternal grandfather.    Medications and Allergies     Medications  Outpatient Medications Marked as Taking for the 10/20/22 encounter (Office Visit) with Ja Hernandez MD   Medication Sig Dispense Refill    albuterol (PROVENTIL/VENTOLIN HFA) 90 mcg/actuation inhaler Inhale 2 puffs into the lungs every 6 (six) hours as needed for Wheezing. Rescue 18 g 2    apixaban (ELIQUIS) 2.5 mg Tab Take 1 tablet (2.5 mg total) by mouth 2 (two) times daily. 60 tablet 5    carvediloL (COREG) 12.5 MG tablet TAKE 1 TABLET BY MOUTH 2 TIMES DAILY. (Patient taking differently: Take 6.25 mg by mouth 2 (two) times daily.) 180 tablet 1    sevelamer carbonate (RENVELA) 800 mg Tab TAKE 1 TABLET BY MOUTH 3 TIMES DAILY WITH MEALS. 270 tablet 1       Allergies  Review of patient's allergies indicates:   Allergen Reactions    Ace inhibitors Swelling    Iodine and iodide containing products Nausea And Vomiting     SHRIMP       Physical Examination     Vitals:    10/20/22 0708   BP: 106/70   Pulse: 83   Temp: 98.9 °F (37.2 °C)     Physical Exam  Constitutional:       General: He is not in acute distress.     Appearance: He is not ill-appearing.   HENT:      Head: Normocephalic and atraumatic.      Right Ear: Tympanic membrane and ear canal normal.      Left Ear: Tympanic membrane and ear canal normal.      Nose: Nose normal. No congestion or rhinorrhea.   Eyes:      Pupils: Pupils are equal, round, and reactive to light.    Cardiovascular:      Rate and Rhythm: Normal rate and regular rhythm.      Pulses: Normal pulses.      Heart sounds: No murmur heard.  Pulmonary:      Effort: No respiratory distress.      Breath sounds: No wheezing, rhonchi or rales.   Abdominal:      General: Bowel sounds are normal.      Palpations: Abdomen is soft.      Tenderness: There is no abdominal tenderness.      Hernia: No hernia is present.   Musculoskeletal:      Cervical back: Normal range of motion and neck supple.   Lymphadenopathy:      Cervical: No cervical adenopathy.   Skin:     General: Skin is warm and dry.   Neurological:      Mental Status: He is alert.   Psychiatric:         Behavior: Behavior normal.         Thought Content: Thought content normal.        Assessment and Plan (including Health Maintenance)   :    Plan:         Health Maintenance Due   Topic Date Due    COVID-19 Vaccine (1) Never done    HIV Screening  Never done    TETANUS VACCINE  Never done    High Dose Statin  Never done    Colorectal Cancer Screening  Never done    Shingles Vaccine (1 of 2) Never done    Influenza Vaccine (1) Never done       Problem List Items Addressed This Visit    None    There are no diagnoses linked to this encounter.   Health Maintenance Topics with due status: Not Due       Topic Last Completion Date    Lipid Panel 04/24/2022       Procedures     No future appointments.     No follow-ups on file.       Signature:  Ja Hernandez MD  [facility]  [address] [place] [address]  [phone] Phone  [phone] Fax    Date of encounter: 10/20/22

## 2023-02-07 ENCOUNTER — OFFICE VISIT (OUTPATIENT)
Dept: FAMILY MEDICINE | Facility: CLINIC | Age: 58
End: 2023-02-07
Payer: COMMERCIAL

## 2023-02-07 VITALS
TEMPERATURE: 99 F | BODY MASS INDEX: 30.61 KG/M2 | SYSTOLIC BLOOD PRESSURE: 108 MMHG | HEART RATE: 76 BPM | DIASTOLIC BLOOD PRESSURE: 70 MMHG | WEIGHT: 206.63 LBS | HEIGHT: 69 IN | OXYGEN SATURATION: 98 %

## 2023-02-07 DIAGNOSIS — Z12.11 COLON CANCER SCREENING: ICD-10-CM

## 2023-02-07 DIAGNOSIS — N18.6 ESRD (END STAGE RENAL DISEASE) ON DIALYSIS: ICD-10-CM

## 2023-02-07 DIAGNOSIS — I10 HYPERTENSION, UNSPECIFIED TYPE: Primary | ICD-10-CM

## 2023-02-07 DIAGNOSIS — Z12.5 SCREENING PSA (PROSTATE SPECIFIC ANTIGEN): ICD-10-CM

## 2023-02-07 DIAGNOSIS — Z99.2 ESRD (END STAGE RENAL DISEASE) ON DIALYSIS: ICD-10-CM

## 2023-02-07 LAB
ANION GAP SERPL CALCULATED.3IONS-SCNC: 10 MMOL/L (ref 7–16)
BASOPHILS # BLD AUTO: 0.02 K/UL (ref 0–0.2)
BASOPHILS NFR BLD AUTO: 0.4 % (ref 0–1)
BUN SERPL-MCNC: 53 MG/DL (ref 7–18)
BUN/CREAT SERPL: 6 (ref 6–20)
CALCIUM SERPL-MCNC: 8.6 MG/DL (ref 8.5–10.1)
CHLORIDE SERPL-SCNC: 95 MMOL/L (ref 98–107)
CHOLEST SERPL-MCNC: 175 MG/DL (ref 0–200)
CHOLEST/HDLC SERPL: 3.8 {RATIO}
CO2 SERPL-SCNC: 36 MMOL/L (ref 21–32)
CREAT SERPL-MCNC: 9.29 MG/DL (ref 0.7–1.3)
DIFFERENTIAL METHOD BLD: ABNORMAL
EGFR (NO RACE VARIABLE) (RUSH/TITUS): 6 ML/MIN/1.73M²
EOSINOPHIL # BLD AUTO: 0.09 K/UL (ref 0–0.5)
EOSINOPHIL NFR BLD AUTO: 2 % (ref 1–4)
ERYTHROCYTE [DISTWIDTH] IN BLOOD BY AUTOMATED COUNT: 14.1 % (ref 11.5–14.5)
GLUCOSE SERPL-MCNC: 96 MG/DL (ref 74–106)
HCT VFR BLD AUTO: 44.1 % (ref 40–54)
HDLC SERPL-MCNC: 46 MG/DL (ref 40–60)
HGB BLD-MCNC: 13.7 G/DL (ref 13.5–18)
IMM GRANULOCYTES # BLD AUTO: 0.01 K/UL (ref 0–0.04)
IMM GRANULOCYTES NFR BLD: 0.2 % (ref 0–0.4)
LDLC SERPL CALC-MCNC: 106 MG/DL
LDLC/HDLC SERPL: 2.3 {RATIO}
LYMPHOCYTES # BLD AUTO: 1.06 K/UL (ref 1–4.8)
LYMPHOCYTES NFR BLD AUTO: 23.5 % (ref 27–41)
MCH RBC QN AUTO: 27.9 PG (ref 27–31)
MCHC RBC AUTO-ENTMCNC: 31.1 G/DL (ref 32–36)
MCV RBC AUTO: 89.8 FL (ref 80–96)
MONOCYTES # BLD AUTO: 0.45 K/UL (ref 0–0.8)
MONOCYTES NFR BLD AUTO: 10 % (ref 2–6)
MPC BLD CALC-MCNC: 10.5 FL (ref 9.4–12.4)
NEUTROPHILS # BLD AUTO: 2.88 K/UL (ref 1.8–7.7)
NEUTROPHILS NFR BLD AUTO: 63.9 % (ref 53–65)
NONHDLC SERPL-MCNC: 129 MG/DL
NRBC # BLD AUTO: 0 X10E3/UL
NRBC, AUTO (.00): 0 %
PLATELET # BLD AUTO: 218 K/UL (ref 150–400)
POTASSIUM SERPL-SCNC: 4.8 MMOL/L (ref 3.5–5.1)
PSA SERPL-MCNC: 0.64 NG/ML
RBC # BLD AUTO: 4.91 M/UL (ref 4.6–6.2)
SODIUM SERPL-SCNC: 136 MMOL/L (ref 136–145)
TRIGL SERPL-MCNC: 115 MG/DL (ref 35–150)
VLDLC SERPL-MCNC: 23 MG/DL
WBC # BLD AUTO: 4.51 K/UL (ref 4.5–11)

## 2023-02-07 PROCEDURE — 3074F PR MOST RECENT SYSTOLIC BLOOD PRESSURE < 130 MM HG: ICD-10-PCS | Mod: CPTII,,, | Performed by: FAMILY MEDICINE

## 2023-02-07 PROCEDURE — G0103 PSA SCREENING: HCPCS | Mod: ,,, | Performed by: CLINICAL MEDICAL LABORATORY

## 2023-02-07 PROCEDURE — 80061 LIPID PANEL: CPT | Mod: ,,, | Performed by: CLINICAL MEDICAL LABORATORY

## 2023-02-07 PROCEDURE — 85025 COMPLETE CBC W/AUTO DIFF WBC: CPT | Mod: ,,, | Performed by: CLINICAL MEDICAL LABORATORY

## 2023-02-07 PROCEDURE — 99214 OFFICE O/P EST MOD 30 MIN: CPT | Mod: ,,, | Performed by: FAMILY MEDICINE

## 2023-02-07 PROCEDURE — 85025 CBC WITH DIFFERENTIAL: ICD-10-PCS | Mod: ,,, | Performed by: CLINICAL MEDICAL LABORATORY

## 2023-02-07 PROCEDURE — 3008F BODY MASS INDEX DOCD: CPT | Mod: CPTII,,, | Performed by: FAMILY MEDICINE

## 2023-02-07 PROCEDURE — G0103 PSA, SCREENING: ICD-10-PCS | Mod: ,,, | Performed by: CLINICAL MEDICAL LABORATORY

## 2023-02-07 PROCEDURE — 80048 BASIC METABOLIC PANEL: ICD-10-PCS | Mod: ,,, | Performed by: CLINICAL MEDICAL LABORATORY

## 2023-02-07 PROCEDURE — 3008F PR BODY MASS INDEX (BMI) DOCUMENTED: ICD-10-PCS | Mod: CPTII,,, | Performed by: FAMILY MEDICINE

## 2023-02-07 PROCEDURE — 80061 LIPID PANEL: ICD-10-PCS | Mod: ,,, | Performed by: CLINICAL MEDICAL LABORATORY

## 2023-02-07 PROCEDURE — 3078F DIAST BP <80 MM HG: CPT | Mod: CPTII,,, | Performed by: FAMILY MEDICINE

## 2023-02-07 PROCEDURE — 3078F PR MOST RECENT DIASTOLIC BLOOD PRESSURE < 80 MM HG: ICD-10-PCS | Mod: CPTII,,, | Performed by: FAMILY MEDICINE

## 2023-02-07 PROCEDURE — 3074F SYST BP LT 130 MM HG: CPT | Mod: CPTII,,, | Performed by: FAMILY MEDICINE

## 2023-02-07 PROCEDURE — 1159F PR MEDICATION LIST DOCUMENTED IN MEDICAL RECORD: ICD-10-PCS | Mod: CPTII,,, | Performed by: FAMILY MEDICINE

## 2023-02-07 PROCEDURE — 1159F MED LIST DOCD IN RCRD: CPT | Mod: CPTII,,, | Performed by: FAMILY MEDICINE

## 2023-02-07 PROCEDURE — 80048 BASIC METABOLIC PNL TOTAL CA: CPT | Mod: ,,, | Performed by: CLINICAL MEDICAL LABORATORY

## 2023-02-07 PROCEDURE — 99214 PR OFFICE/OUTPT VISIT, EST, LEVL IV, 30-39 MIN: ICD-10-PCS | Mod: ,,, | Performed by: FAMILY MEDICINE

## 2023-02-07 RX ORDER — SEVELAMER CARBONATE 800 MG/1
TABLET, FILM COATED ORAL
Qty: 270 TABLET | Refills: 1 | Status: SHIPPED | OUTPATIENT
Start: 2023-02-07 | End: 2023-08-17 | Stop reason: SDUPTHER

## 2023-02-07 RX ORDER — CARVEDILOL 12.5 MG/1
12.5 TABLET ORAL 2 TIMES DAILY
Qty: 180 TABLET | Refills: 1 | Status: SHIPPED | OUTPATIENT
Start: 2023-02-07 | End: 2023-08-17 | Stop reason: SDUPTHER

## 2023-02-07 NOTE — PROGRESS NOTES
Ja Hernandez MD   [facility]  [address] [place] [address]     PATIENT NAME: Catalina Stephens  : 1965  DATE: 23  MRN: [id number]      Billing Provider: Ja Hernandez MD  Level of Service:   Patient PCP Information       Provider PCP Type    Ja Hernandez MD General            Reason for Visit / Chief Complaint: Hypertension (Check up, refills, and labs.)         History of Present Illness / Problem Focused Workflow     Catalina Stephens presents to the clinic with Hypertension (Check up, refills, and labs.)     HPI    Review of Systems     Review of Systems     Medical / Social / Family History     Past Medical History:   Diagnosis Date    CHF (congestive heart failure)     Chronic systolic (congestive) heart failure     Hypertension     Mild intermittent asthma, uncomplicated     Polycystic kidney disease        Past Surgical History:   Procedure Laterality Date    CENTRAL VENOUS CATHETER INSERTION  2022    Right subclavian dialysis catheter    Left AV Graft  2022    Raymundo New DO    LEFT HEART CATHETERIZATION         Social History  Catalina Stephens  reports that he has never smoked. He has never used smokeless tobacco. He reports that he does not currently use alcohol. He reports that he does not use drugs.    Family History  Catalina Stephens  family history includes Heart attack in his father; Heart disease in his father; Kidney disease in his father; No Known Problems in his mother; Stroke in his maternal grandfather.    Medications and Allergies     Medications  Outpatient Medications Marked as Taking for the 23 encounter (Office Visit) with Ja Hernandez MD   Medication Sig Dispense Refill    albuterol (PROVENTIL/VENTOLIN HFA) 90 mcg/actuation inhaler Inhale 2 puffs into the lungs every 6 (six) hours as needed for Wheezing. Rescue 18 g 2    apixaban (ELIQUIS) 2.5 mg Tab Take 1 tablet (2.5 mg total) by mouth 2 (two) times  daily. 60 tablet 5    carvediloL (COREG) 12.5 MG tablet Take 1 tablet (12.5 mg total) by mouth 2 (two) times daily. 60 tablet 0    LIDOcaine-prilocaine (EMLA) cream use as directed      sevelamer carbonate (RENVELA) 800 mg Tab TAKE 1 TABLET BY MOUTH 3 TIMES DAILY WITH MEALS. 270 tablet 1       Allergies  Review of patient's allergies indicates:   Allergen Reactions    Ace inhibitors Swelling    Iodine and iodide containing products Nausea And Vomiting     SHRIMP       Physical Examination     Vitals:    02/07/23 0719   BP: 108/70   Pulse: 76   Temp: 98.6 °F (37 °C)     Physical Exam     Assessment and Plan (including Health Maintenance)   :    Plan:         Health Maintenance Due   Topic Date Due    COVID-19 Vaccine (1) Never done    HIV Screening  Never done    TETANUS VACCINE  Never done    Hemoglobin A1c (Diabetic Prevention Screening)  Never done    Colorectal Cancer Screening  Never done    Shingles Vaccine (1 of 2) Never done    Influenza Vaccine (1) Never done       Problem List Items Addressed This Visit          Cardiac/Vascular    Hypertension - Primary    Relevant Orders    Basic Metabolic Panel    Lipid Panel    CBC Auto Differential     Other Visit Diagnoses       Screening PSA (prostate specific antigen)        Relevant Orders    PSA, Screening          Hypertension, unspecified type  -     Basic Metabolic Panel; Future; Expected date: 02/07/2023  -     Lipid Panel; Future; Expected date: 02/07/2023  -     CBC Auto Differential; Future; Expected date: 02/07/2023    Screening PSA (prostate specific antigen)  -     PSA, Screening; Future; Expected date: 02/07/2023       Health Maintenance Topics with due status: Not Due       Topic Last Completion Date    Lipid Panel 04/24/2022       Procedures     Future Appointments   Date Time Provider Department Center   2/7/2023  8:20 AM Ja Hernandez MD [facility] [place]        No follow-ups on file.       Signature:  MD Baylee Boyle  [facility]  [address] [place] [address]  [phone] Phone  [phone] Fax    Date of encounter: 2/7/23

## 2023-02-07 NOTE — PROGRESS NOTES
Ja Hernandez MD   [facility]  [address] [place] [address]     PATIENT NAME: Catalina Stephens  : 1965  DATE: 23  MRN: [id number]      Billing Provider: Ja Hernandez MD  Level of Service: [place] OFFICE/OUTPT VISIT, EST, LEVL IV, 30-39 MIN  Patient PCP Information       Provider PCP Type    Ja Hernandez MD General            Reason for Visit / Chief Complaint: Hypertension (Check up, refills, and labs.)         History of Present Illness / Problem Focused Workflow     Catalina Stephens presents to the clinic with Hypertension (Check up, refills, and labs.)     HPI    Review of Systems     Review of Systems   Constitutional:  Negative for activity change, appetite change, fatigue and fever.   HENT:  Negative for nasal congestion, ear pain, hearing loss, sinus pressure/congestion and sore throat.    Respiratory:  Negative for cough, chest tightness and shortness of breath.    Cardiovascular:  Negative for chest pain and palpitations.   Gastrointestinal:  Negative for abdominal pain and fecal incontinence.   Genitourinary:  Negative for bladder incontinence, difficulty urinating and erectile dysfunction.   Musculoskeletal:  Negative for arthralgias.   Integumentary:  Negative for rash.   Neurological:  Negative for dizziness and headaches.      Medical / Social / Family History     Past Medical History:   Diagnosis Date    CHF (congestive heart failure)     Chronic systolic (congestive) heart failure     Hypertension     Mild intermittent asthma, uncomplicated     Polycystic kidney disease        Past Surgical History:   Procedure Laterality Date    CENTRAL VENOUS CATHETER INSERTION  2022    Right subclavian dialysis catheter    Left AV Graft  2022    Raymundo New,     LEFT HEART CATHETERIZATION         Social History  Catalina Stephens  reports that he has never smoked. He has never used smokeless tobacco. He reports that he does not currently use  alcohol. He reports that he does not use drugs.    Family History  Catalina Stephens  family history includes Heart attack in his father; Heart disease in his father; Kidney disease in his father; No Known Problems in his mother; Stroke in his maternal grandfather.    Medications and Allergies     Medications  Outpatient Medications Marked as Taking for the 2/7/23 encounter (Office Visit) with Ja Hernandez MD   Medication Sig Dispense Refill    albuterol (PROVENTIL/VENTOLIN HFA) 90 mcg/actuation inhaler Inhale 2 puffs into the lungs every 6 (six) hours as needed for Wheezing. Rescue 18 g 2    LIDOcaine-prilocaine (EMLA) cream use as directed      [DISCONTINUED] apixaban (ELIQUIS) 2.5 mg Tab Take 1 tablet (2.5 mg total) by mouth 2 (two) times daily. 60 tablet 5    [DISCONTINUED] carvediloL (COREG) 12.5 MG tablet Take 1 tablet (12.5 mg total) by mouth 2 (two) times daily. 60 tablet 0    [DISCONTINUED] sevelamer carbonate (RENVELA) 800 mg Tab TAKE 1 TABLET BY MOUTH 3 TIMES DAILY WITH MEALS. 270 tablet 1       Allergies  Review of patient's allergies indicates:   Allergen Reactions    Ace inhibitors Swelling    Iodine and iodide containing products Nausea And Vomiting     SHRIMP       Physical Examination     Vitals:    02/07/23 0719   BP: 108/70   Pulse: 76   Temp: 98.6 °F (37 °C)     Physical Exam  Constitutional:       General: He is not in acute distress.     Appearance: He is not ill-appearing.   HENT:      Head: Normocephalic and atraumatic.      Right Ear: Tympanic membrane and ear canal normal.      Left Ear: Tympanic membrane and ear canal normal.      Nose: Nose normal. No congestion or rhinorrhea.   Eyes:      Pupils: Pupils are equal, round, and reactive to light.   Cardiovascular:      Rate and Rhythm: Normal rate and regular rhythm.      Pulses: Normal pulses.      Heart sounds: No murmur heard.  Pulmonary:      Effort: No respiratory distress.      Breath sounds: No wheezing, rhonchi or rales.    Abdominal:      General: Bowel sounds are normal.      Palpations: Abdomen is soft.      Tenderness: There is no abdominal tenderness.      Hernia: No hernia is present.   Musculoskeletal:      Cervical back: Normal range of motion and neck supple.   Lymphadenopathy:      Cervical: No cervical adenopathy.   Skin:     General: Skin is warm and dry.   Neurological:      Mental Status: He is alert.   Psychiatric:         Behavior: Behavior normal.         Thought Content: Thought content normal.        Assessment and Plan (including Health Maintenance)   :    Plan:         Health Maintenance Due   Topic Date Due    COVID-19 Vaccine (1) Never done    HIV Screening  Never done    TETANUS VACCINE  Never done    Hemoglobin A1c (Diabetic Prevention Screening)  Never done    Colorectal Cancer Screening  Never done    Shingles Vaccine (1 of 2) Never done    Influenza Vaccine (1) Never done       Problem List Items Addressed This Visit          Cardiac/Vascular    Hypertension - Primary    Relevant Orders    Basic Metabolic Panel    Lipid Panel    CBC Auto Differential     Other Visit Diagnoses       Screening PSA (prostate specific antigen)        Relevant Orders    PSA, Screening    ESRD (end stage renal disease) on dialysis        BMI 30.0-30.9,adult        Colon cancer screening        Relevant Orders    Ambulatory referral/consult to Gastroenterology          Hypertension, unspecified type  -     Basic Metabolic Panel; Future; Expected date: 02/07/2023  -     Lipid Panel; Future; Expected date: 02/07/2023  -     CBC Auto Differential; Future; Expected date: 02/07/2023    Screening PSA (prostate specific antigen)  -     PSA, Screening; Future; Expected date: 02/07/2023    ESRD (end stage renal disease) on dialysis    BMI 30.0-30.9,adult    Colon cancer screening  -     Ambulatory referral/consult to Gastroenterology; Future; Expected date: 02/14/2023    Other orders  -     apixaban (ELIQUIS) 2.5 mg Tab; Take 1 tablet  (2.5 mg total) by mouth 2 (two) times daily.  Dispense: 180 tablet; Refill: 1  -     carvediloL (COREG) 12.5 MG tablet; Take 1 tablet (12.5 mg total) by mouth 2 (two) times daily.  Dispense: 180 tablet; Refill: 1  -     sevelamer carbonate (RENVELA) 800 mg Tab; TAKE 1 TABLET BY MOUTH 3 TIMES DAILY WITH MEALS.  Strength: 800 mg  Dispense: 270 tablet; Refill: 1       Health Maintenance Topics with due status: Not Due       Topic Last Completion Date    Lipid Panel 04/24/2022       Procedures     Future Appointments   Date Time Provider Department Center   2/7/2023  8:20 AM Ja Hernandez MD [facility]        No follow-ups on file.       Signature:  Ja Hernandez MD  [facility]  [address] [facility]   [place] [address]  [phone] Phone  [phone] Fax    Date of encounter: 2/7/23

## 2023-02-08 DIAGNOSIS — Z12.11 COLON CANCER SCREENING: Primary | ICD-10-CM

## 2023-02-09 DIAGNOSIS — Z71.89 COMPLEX CARE COORDINATION: ICD-10-CM

## 2023-08-10 RX ORDER — APIXABAN 2.5 MG/1
2.5 TABLET, FILM COATED ORAL 2 TIMES DAILY
Qty: 60 TABLET | Refills: 0 | Status: SHIPPED | OUTPATIENT
Start: 2023-08-10 | End: 2023-08-17

## 2023-08-10 NOTE — TELEPHONE ENCOUNTER
----- Message from Trini Cast sent at 8/10/2023  7:24 AM CDT -----  Eliquis refill. [facility]  PT# [phone]

## 2023-08-17 ENCOUNTER — OFFICE VISIT (OUTPATIENT)
Dept: FAMILY MEDICINE | Facility: CLINIC | Age: 58
End: 2023-08-17
Payer: COMMERCIAL

## 2023-08-17 VITALS
DIASTOLIC BLOOD PRESSURE: 70 MMHG | HEIGHT: 69 IN | SYSTOLIC BLOOD PRESSURE: 104 MMHG | TEMPERATURE: 99 F | OXYGEN SATURATION: 97 % | BODY MASS INDEX: 30.83 KG/M2 | HEART RATE: 80 BPM | WEIGHT: 208.19 LBS

## 2023-08-17 DIAGNOSIS — I82.619: ICD-10-CM

## 2023-08-17 DIAGNOSIS — N28.9 RENAL DISEASE: ICD-10-CM

## 2023-08-17 DIAGNOSIS — I10 HYPERTENSION, UNSPECIFIED TYPE: Primary | ICD-10-CM

## 2023-08-17 LAB
ALBUMIN SERPL BCP-MCNC: 3.5 G/DL (ref 3.5–5)
ALBUMIN/GLOB SERPL: 1 {RATIO}
ALP SERPL-CCNC: 41 U/L (ref 45–115)
ALT SERPL W P-5'-P-CCNC: 13 U/L (ref 16–61)
ANION GAP SERPL CALCULATED.3IONS-SCNC: 10 MMOL/L (ref 7–16)
AST SERPL W P-5'-P-CCNC: 10 U/L (ref 15–37)
BASOPHILS # BLD AUTO: 0.02 K/UL (ref 0–0.2)
BASOPHILS NFR BLD AUTO: 0.4 % (ref 0–1)
BILIRUB SERPL-MCNC: 0.3 MG/DL (ref ?–1.2)
BUN SERPL-MCNC: 35 MG/DL (ref 7–18)
BUN/CREAT SERPL: 4 (ref 6–20)
CALCIUM SERPL-MCNC: 9.3 MG/DL (ref 8.5–10.1)
CHLORIDE SERPL-SCNC: 99 MMOL/L (ref 98–107)
CHOLEST SERPL-MCNC: 162 MG/DL (ref 0–200)
CHOLEST/HDLC SERPL: 3.2 {RATIO}
CO2 SERPL-SCNC: 33 MMOL/L (ref 21–32)
CREAT SERPL-MCNC: 8.58 MG/DL (ref 0.7–1.3)
DIFFERENTIAL METHOD BLD: ABNORMAL
EGFR (NO RACE VARIABLE) (RUSH/TITUS): 7 ML/MIN/1.73M2
EOSINOPHIL # BLD AUTO: 0.03 K/UL (ref 0–0.5)
EOSINOPHIL NFR BLD AUTO: 0.6 % (ref 1–4)
ERYTHROCYTE [DISTWIDTH] IN BLOOD BY AUTOMATED COUNT: 13.9 % (ref 11.5–14.5)
GLOBULIN SER-MCNC: 3.6 G/DL (ref 2–4)
GLUCOSE SERPL-MCNC: 75 MG/DL (ref 74–106)
HCT VFR BLD AUTO: 34.4 % (ref 40–54)
HDLC SERPL-MCNC: 50 MG/DL (ref 40–60)
HGB BLD-MCNC: 10.4 G/DL (ref 13.5–18)
IMM GRANULOCYTES # BLD AUTO: 0.01 K/UL (ref 0–0.04)
IMM GRANULOCYTES NFR BLD: 0.2 % (ref 0–0.4)
LDLC SERPL CALC-MCNC: 100 MG/DL
LDLC/HDLC SERPL: 2 {RATIO}
LYMPHOCYTES # BLD AUTO: 1.12 K/UL (ref 1–4.8)
LYMPHOCYTES NFR BLD AUTO: 23.2 % (ref 27–41)
MCH RBC QN AUTO: 26.3 PG (ref 27–31)
MCHC RBC AUTO-ENTMCNC: 30.2 G/DL (ref 32–36)
MCV RBC AUTO: 86.9 FL (ref 80–96)
MONOCYTES # BLD AUTO: 0.46 K/UL (ref 0–0.8)
MONOCYTES NFR BLD AUTO: 9.5 % (ref 2–6)
MPC BLD CALC-MCNC: 10.9 FL (ref 9.4–12.4)
NEUTROPHILS # BLD AUTO: 3.18 K/UL (ref 1.8–7.7)
NEUTROPHILS NFR BLD AUTO: 66.1 % (ref 53–65)
NONHDLC SERPL-MCNC: 112 MG/DL
NRBC # BLD AUTO: 0 X10E3/UL
NRBC, AUTO (.00): 0 %
PLATELET # BLD AUTO: 237 K/UL (ref 150–400)
POTASSIUM SERPL-SCNC: 4.2 MMOL/L (ref 3.5–5.1)
PROT SERPL-MCNC: 7.1 G/DL (ref 6.4–8.2)
RBC # BLD AUTO: 3.96 M/UL (ref 4.6–6.2)
SODIUM SERPL-SCNC: 138 MMOL/L (ref 136–145)
TRIGL SERPL-MCNC: 61 MG/DL (ref 35–150)
VLDLC SERPL-MCNC: 12 MG/DL
WBC # BLD AUTO: 4.82 K/UL (ref 4.5–11)

## 2023-08-17 PROCEDURE — 80053 COMPREHEN METABOLIC PANEL: CPT | Mod: ,,, | Performed by: CLINICAL MEDICAL LABORATORY

## 2023-08-17 PROCEDURE — 85025 COMPLETE CBC W/AUTO DIFF WBC: CPT | Mod: ,,, | Performed by: CLINICAL MEDICAL LABORATORY

## 2023-08-17 PROCEDURE — 99214 OFFICE O/P EST MOD 30 MIN: CPT | Mod: ,,, | Performed by: FAMILY MEDICINE

## 2023-08-17 PROCEDURE — 80053 COMPREHENSIVE METABOLIC PANEL: ICD-10-PCS | Mod: ,,, | Performed by: CLINICAL MEDICAL LABORATORY

## 2023-08-17 PROCEDURE — 3008F BODY MASS INDEX DOCD: CPT | Mod: CPTII,,, | Performed by: FAMILY MEDICINE

## 2023-08-17 PROCEDURE — 85025 CBC WITH DIFFERENTIAL: ICD-10-PCS | Mod: ,,, | Performed by: CLINICAL MEDICAL LABORATORY

## 2023-08-17 PROCEDURE — 1159F MED LIST DOCD IN RCRD: CPT | Mod: CPTII,,, | Performed by: FAMILY MEDICINE

## 2023-08-17 PROCEDURE — 80061 LIPID PANEL: CPT | Mod: ,,, | Performed by: CLINICAL MEDICAL LABORATORY

## 2023-08-17 PROCEDURE — 80061 LIPID PANEL: ICD-10-PCS | Mod: ,,, | Performed by: CLINICAL MEDICAL LABORATORY

## 2023-08-17 PROCEDURE — 3078F PR MOST RECENT DIASTOLIC BLOOD PRESSURE < 80 MM HG: ICD-10-PCS | Mod: CPTII,,, | Performed by: FAMILY MEDICINE

## 2023-08-17 PROCEDURE — 3008F PR BODY MASS INDEX (BMI) DOCUMENTED: ICD-10-PCS | Mod: CPTII,,, | Performed by: FAMILY MEDICINE

## 2023-08-17 PROCEDURE — 3074F SYST BP LT 130 MM HG: CPT | Mod: CPTII,,, | Performed by: FAMILY MEDICINE

## 2023-08-17 PROCEDURE — 3078F DIAST BP <80 MM HG: CPT | Mod: CPTII,,, | Performed by: FAMILY MEDICINE

## 2023-08-17 PROCEDURE — 3074F PR MOST RECENT SYSTOLIC BLOOD PRESSURE < 130 MM HG: ICD-10-PCS | Mod: CPTII,,, | Performed by: FAMILY MEDICINE

## 2023-08-17 PROCEDURE — 99214 PR OFFICE/OUTPT VISIT, EST, LEVL IV, 30-39 MIN: ICD-10-PCS | Mod: ,,, | Performed by: FAMILY MEDICINE

## 2023-08-17 PROCEDURE — 1159F PR MEDICATION LIST DOCUMENTED IN MEDICAL RECORD: ICD-10-PCS | Mod: CPTII,,, | Performed by: FAMILY MEDICINE

## 2023-08-17 RX ORDER — CARVEDILOL 12.5 MG/1
12.5 TABLET ORAL 2 TIMES DAILY
Qty: 180 TABLET | Refills: 1 | Status: SHIPPED | OUTPATIENT
Start: 2023-08-17 | End: 2024-01-18 | Stop reason: SDUPTHER

## 2023-08-17 RX ORDER — SEVELAMER CARBONATE 800 MG/1
TABLET, FILM COATED ORAL
Qty: 270 TABLET | Refills: 1 | Status: SHIPPED | OUTPATIENT
Start: 2023-08-17 | End: 2024-03-26

## 2023-08-17 RX ORDER — CLOPIDOGREL BISULFATE 75 MG/1
75 TABLET, FILM COATED ORAL
COMMUNITY
Start: 2023-08-14 | End: 2024-01-05

## 2023-08-17 NOTE — PROGRESS NOTES
Ja Hernandez MD   [facility]  [address] [place] [address]     PATIENT NAME: Catalina Stephens  : 1965  DATE: 23  MRN: [id number]      Billing Provider: Ja Hernandez MD  Level of Service: [place] OFFICE/OUTPT VISIT, EST, LEVL IV, 30-39 MIN  Patient PCP Information       Provider PCP Type    Ja Hernandez MD General            Reason for Visit / Chief Complaint: Hypertension (Check up, labs, refills. Wants to discuss changing from eliquis to Plavix. Patient states vascular surgeon wants patient to change to Plavix. Patient had trouble with clotting to dialysis port twice in the past month. Patient had most recent surgery two weeks ago. )         History of Present Illness / Problem Focused Workflow     Catalina Stephens presents to the clinic with Hypertension (Check up, labs, refills. Wants to discuss changing from eliquis to Plavix. Patient states vascular surgeon wants patient to change to Plavix. Patient had trouble with clotting to dialysis port twice in the past month. Patient had most recent surgery two weeks ago. )     HPI    Review of Systems     Review of Systems   Constitutional:  Negative for activity change, appetite change, fatigue and fever.   HENT:  Negative for nasal congestion, ear pain, hearing loss, sinus pressure/congestion and sore throat.    Respiratory:  Negative for cough, chest tightness and shortness of breath.    Cardiovascular:  Negative for chest pain and palpitations.   Gastrointestinal:  Negative for abdominal pain and fecal incontinence.   Genitourinary:  Negative for bladder incontinence, difficulty urinating and erectile dysfunction.   Musculoskeletal:  Negative for arthralgias.   Integumentary:  Negative for rash.   Neurological:  Negative for dizziness and headaches.        Medical / Social / Family History     Past Medical History:   Diagnosis Date    CHF (congestive heart failure)     Chronic systolic (congestive) heart  failure     Hypertension     Mild intermittent asthma, uncomplicated     Polycystic kidney disease        Past Surgical History:   Procedure Laterality Date    CENTRAL VENOUS CATHETER INSERTION  04/27/2022    Right subclavian dialysis catheter    Left AV Graft  06/16/2022    Raymundo New, DO    LEFT HEART CATHETERIZATION         Social History  Catalina Stephens  reports that he has never smoked. He has never used smokeless tobacco. He reports that he does not currently use alcohol. He reports that he does not use drugs.    Family History  Catalina Stephens  family history includes Heart attack in his father; Heart disease in his father; Kidney disease in his father; No Known Problems in his mother; Stroke in his maternal grandfather.    Medications and Allergies     Medications  Outpatient Medications Marked as Taking for the 8/17/23 encounter (Office Visit) with Ja Hernandez MD   Medication Sig Dispense Refill    LIDOcaine-prilocaine (EMLA) cream use as directed      [DISCONTINUED] carvediloL (COREG) 12.5 MG tablet Take 1 tablet (12.5 mg total) by mouth 2 (two) times daily. 180 tablet 1    [DISCONTINUED] ELIQUIS 2.5 mg Tab TAKE 1 TABLET (2.5 MG TOTAL) BY MOUTH 2 (TWO) TIMES DAILY. 60 tablet 0    [DISCONTINUED] sevelamer carbonate (RENVELA) 800 mg Tab TAKE 1 TABLET BY MOUTH 3 TIMES DAILY WITH MEALS.  Strength: 800 mg 270 tablet 1       Allergies  Review of patient's allergies indicates:   Allergen Reactions    Ace inhibitors Swelling    Iodine and iodide containing products Nausea And Vomiting     SHRIMP       Physical Examination     Vitals:    08/17/23 1110   BP: 104/70   Pulse: 80   Temp: 98.5 °F (36.9 °C)     Physical Exam     Assessment and Plan (including Health Maintenance)   :    Plan:         Health Maintenance Due   Topic Date Due    COVID-19 Vaccine (1) Never done    Pneumococcal Vaccines (Age 0-64) (1 - PCV) Never done    HIV Screening  Never done    TETANUS VACCINE  Never done    Hemoglobin A1c  (Diabetic Prevention Screening)  Never done    Colorectal Cancer Screening  Never done    Shingles Vaccine (1 of 2) Never done       Problem List Items Addressed This Visit          Cardiac/Vascular    Hypertension - Primary    Relevant Orders    CBC Auto Differential    Comprehensive Metabolic Panel    Lipid Panel       Renal/    Renal disease       Hematology    Acute thrombosis of cephalic vein     Hypertension, unspecified type  -     CBC Auto Differential; Future; Expected date: 08/17/2023  -     Comprehensive Metabolic Panel; Future; Expected date: 08/17/2023  -     Lipid Panel; Future; Expected date: 08/17/2023    Acute thrombosis of cephalic vein, unspecified laterality    Renal disease    Other orders  -     carvediloL (COREG) 12.5 MG tablet; Take 1 tablet (12.5 mg total) by mouth 2 (two) times daily.  Dispense: 180 tablet; Refill: 1  -     sevelamer carbonate (RENVELA) 800 mg Tab; TAKE 1 TABLET BY MOUTH 3 TIMES DAILY WITH MEALS.  Strength: 800 mg  Dispense: 270 tablet; Refill: 1       Health Maintenance Topics with due status: Not Due       Topic Last Completion Date    Lipid Panel 02/07/2023    Influenza Vaccine Not Due       Procedures     Future Appointments   Date Time Provider Department Center   11/21/2023  8:40 AM Ja Hernandez MD [facility]        No follow-ups on file.       Signature:  Ja Hernandez MD  [facility]  [address] [facility]   [place] [address]  [phone] Phone  [phone] Fax    Date of encounter: 8/17/23

## 2023-09-25 ENCOUNTER — TELEPHONE (OUTPATIENT)
Dept: FAMILY MEDICINE | Facility: CLINIC | Age: 58
End: 2023-09-25
Payer: COMMERCIAL

## 2023-09-25 NOTE — TELEPHONE ENCOUNTER
Spoke with patient's wife on 9/20/23. States Eliquis was stopped by nephrology and patient had been started on Plavix. States Plavix started making patient bleed, so the Plavix was stopped by nephrology. He is currently waiting to have a colonoscopy set up. Wife states patient started back taking Eliquis on his own. Instructed wife they needed to talk with nephrology to let them know he started taking his Eliquis again and discuss if he should be taking any type of blood thinner at this time. She verbalized understanding.      ----- Message from Selena Figueroa sent at 9/21/2023 12:00 PM CDT -----  Pt is requesting refills on Eliquis to be sent to [facility]  [phone]

## 2024-01-05 NOTE — TELEPHONE ENCOUNTER
Eliquis has been called in by Dr. New. -vascular. Request refill on Eliquis. Stopped Plavix due to bleeding.

## 2024-01-18 ENCOUNTER — OFFICE VISIT (OUTPATIENT)
Dept: FAMILY MEDICINE | Facility: CLINIC | Age: 59
End: 2024-01-18
Payer: COMMERCIAL

## 2024-01-18 VITALS
DIASTOLIC BLOOD PRESSURE: 70 MMHG | HEART RATE: 84 BPM | WEIGHT: 206.13 LBS | TEMPERATURE: 98 F | HEIGHT: 69 IN | SYSTOLIC BLOOD PRESSURE: 108 MMHG | BODY MASS INDEX: 30.53 KG/M2 | OXYGEN SATURATION: 98 %

## 2024-01-18 DIAGNOSIS — N28.9 RENAL DISEASE: ICD-10-CM

## 2024-01-18 DIAGNOSIS — I10 HYPERTENSION, UNSPECIFIED TYPE: Primary | ICD-10-CM

## 2024-01-18 DIAGNOSIS — N18.6 ESRD (END STAGE RENAL DISEASE) ON DIALYSIS: ICD-10-CM

## 2024-01-18 DIAGNOSIS — Z99.2 ESRD (END STAGE RENAL DISEASE) ON DIALYSIS: ICD-10-CM

## 2024-01-18 LAB
ALBUMIN SERPL BCP-MCNC: 3.5 G/DL (ref 3.5–5)
ALBUMIN/GLOB SERPL: 0.9 {RATIO}
ALP SERPL-CCNC: 42 U/L (ref 45–115)
ALT SERPL W P-5'-P-CCNC: 15 U/L (ref 16–61)
ANION GAP SERPL CALCULATED.3IONS-SCNC: 12 MMOL/L (ref 7–16)
AST SERPL W P-5'-P-CCNC: 7 U/L (ref 15–37)
BASOPHILS # BLD AUTO: 0.02 K/UL (ref 0–0.2)
BASOPHILS NFR BLD AUTO: 0.5 % (ref 0–1)
BILIRUB SERPL-MCNC: 0.4 MG/DL (ref ?–1.2)
BUN SERPL-MCNC: 38 MG/DL (ref 7–18)
BUN/CREAT SERPL: 4 (ref 6–20)
CALCIUM SERPL-MCNC: 9.7 MG/DL (ref 8.5–10.1)
CHLORIDE SERPL-SCNC: 102 MMOL/L (ref 98–107)
CHOLEST SERPL-MCNC: 159 MG/DL (ref 0–200)
CHOLEST/HDLC SERPL: 3.5 {RATIO}
CO2 SERPL-SCNC: 27 MMOL/L (ref 21–32)
CREAT SERPL-MCNC: 9.52 MG/DL (ref 0.7–1.3)
DIFFERENTIAL METHOD BLD: ABNORMAL
EGFR (NO RACE VARIABLE) (RUSH/TITUS): 6 ML/MIN/1.73M2
EOSINOPHIL # BLD AUTO: 0.04 K/UL (ref 0–0.5)
EOSINOPHIL NFR BLD AUTO: 1 % (ref 1–4)
ERYTHROCYTE [DISTWIDTH] IN BLOOD BY AUTOMATED COUNT: 14.9 % (ref 11.5–14.5)
GLOBULIN SER-MCNC: 3.7 G/DL (ref 2–4)
GLUCOSE SERPL-MCNC: 96 MG/DL (ref 74–106)
HCT VFR BLD AUTO: 43.6 % (ref 40–54)
HDLC SERPL-MCNC: 46 MG/DL (ref 40–60)
HGB BLD-MCNC: 13.6 G/DL (ref 13.5–18)
IMM GRANULOCYTES # BLD AUTO: 0.01 K/UL (ref 0–0.04)
IMM GRANULOCYTES NFR BLD: 0.2 % (ref 0–0.4)
LDLC SERPL CALC-MCNC: 95 MG/DL
LDLC/HDLC SERPL: 2.1 {RATIO}
LYMPHOCYTES # BLD AUTO: 1.03 K/UL (ref 1–4.8)
LYMPHOCYTES NFR BLD AUTO: 24.8 % (ref 27–41)
MCH RBC QN AUTO: 27.9 PG (ref 27–31)
MCHC RBC AUTO-ENTMCNC: 31.2 G/DL (ref 32–36)
MCV RBC AUTO: 89.5 FL (ref 80–96)
MONOCYTES # BLD AUTO: 0.4 K/UL (ref 0–0.8)
MONOCYTES NFR BLD AUTO: 9.6 % (ref 2–6)
MPC BLD CALC-MCNC: 11.1 FL (ref 9.4–12.4)
NEUTROPHILS # BLD AUTO: 2.66 K/UL (ref 1.8–7.7)
NEUTROPHILS NFR BLD AUTO: 63.9 % (ref 53–65)
NONHDLC SERPL-MCNC: 113 MG/DL
NRBC # BLD AUTO: 0 X10E3/UL
NRBC, AUTO (.00): 0 %
PLATELET # BLD AUTO: 211 K/UL (ref 150–400)
POTASSIUM SERPL-SCNC: 4.3 MMOL/L (ref 3.5–5.1)
PROT SERPL-MCNC: 7.2 G/DL (ref 6.4–8.2)
RBC # BLD AUTO: 4.87 M/UL (ref 4.6–6.2)
SODIUM SERPL-SCNC: 137 MMOL/L (ref 136–145)
TRIGL SERPL-MCNC: 89 MG/DL (ref 35–150)
VLDLC SERPL-MCNC: 18 MG/DL
WBC # BLD AUTO: 4.16 K/UL (ref 4.5–11)

## 2024-01-18 PROCEDURE — 3074F SYST BP LT 130 MM HG: CPT | Mod: CPTII,,, | Performed by: FAMILY MEDICINE

## 2024-01-18 PROCEDURE — 80053 COMPREHEN METABOLIC PANEL: CPT | Mod: ,,, | Performed by: CLINICAL MEDICAL LABORATORY

## 2024-01-18 PROCEDURE — 3008F BODY MASS INDEX DOCD: CPT | Mod: CPTII,,, | Performed by: FAMILY MEDICINE

## 2024-01-18 PROCEDURE — 85025 COMPLETE CBC W/AUTO DIFF WBC: CPT | Mod: ,,, | Performed by: CLINICAL MEDICAL LABORATORY

## 2024-01-18 PROCEDURE — 1159F MED LIST DOCD IN RCRD: CPT | Mod: CPTII,,, | Performed by: FAMILY MEDICINE

## 2024-01-18 PROCEDURE — 99214 OFFICE O/P EST MOD 30 MIN: CPT | Mod: ,,, | Performed by: FAMILY MEDICINE

## 2024-01-18 PROCEDURE — 3078F DIAST BP <80 MM HG: CPT | Mod: CPTII,,, | Performed by: FAMILY MEDICINE

## 2024-01-18 PROCEDURE — 80061 LIPID PANEL: CPT | Mod: ,,, | Performed by: CLINICAL MEDICAL LABORATORY

## 2024-01-18 RX ORDER — CARVEDILOL 12.5 MG/1
12.5 TABLET ORAL 2 TIMES DAILY
Qty: 180 TABLET | Refills: 1 | Status: SHIPPED | OUTPATIENT
Start: 2024-01-18

## 2024-01-18 NOTE — PROGRESS NOTES
Ja Hernandez MD   [facility]  [address] [place] [address]     PATIENT NAME: Catalina Stephens  : 1965  DATE: 24  MRN: [id number]      Billing Provider: Ja Hernandez MD  Level of Service: [place] OFFICE/OUTPT VISIT, EST, LEVL IV, 30-39 MIN  Patient PCP Information       Provider PCP Type    Ja Hernandez MD General            Reason for Visit / Chief Complaint: Hypertension (6 month check up/labs and med refills) and Chronic Kidney Disease (Dialysis on Monday, Wednesday, Friday in [place], [place])         History of Present Illness / Problem Focused Workflow     Catalina Stephens presents to the clinic with Hypertension (6 month check up/labs and med refills) and Chronic Kidney Disease (Dialysis on Monday, Wednesday, Friday in [place], [place])     HPI    Review of Systems     Review of Systems   Constitutional:  Negative for activity change, appetite change, fatigue and fever.   HENT:  Negative for nasal congestion, ear pain, hearing loss, rhinorrhea, sinus pressure/congestion and sore throat.    Respiratory:  Negative for cough, chest tightness and shortness of breath.    Cardiovascular:  Negative for chest pain and palpitations.   Gastrointestinal:  Negative for abdominal pain and fecal incontinence.   Genitourinary:  Negative for bladder incontinence, difficulty urinating and erectile dysfunction.   Musculoskeletal:  Negative for arthralgias.   Integumentary:  Negative for rash.   Neurological:  Negative for dizziness and headaches.        Medical / Social / Family History     Past Medical History:   Diagnosis Date    CHF (congestive heart failure)     Chronic systolic (congestive) heart failure     Hypertension     Mild intermittent asthma, uncomplicated     Polycystic kidney disease        Past Surgical History:   Procedure Laterality Date    CENTRAL VENOUS CATHETER INSERTION  2022    Right subclavian dialysis catheter    Left AV Graft  2022     Raymundo New, DO    LEFT HEART CATHETERIZATION         Social History  Catalina Stephens  reports that he has never smoked. He has never used smokeless tobacco. He reports that he does not currently use alcohol. He reports that he does not use drugs.    Family History  Catalina Stephens  family history includes Heart attack in his father; Heart disease in his father; Kidney disease in his father; No Known Problems in his mother; Stroke in his maternal grandfather.    Medications and Allergies     Medications  Outpatient Medications Marked as Taking for the 1/18/24 encounter (Office Visit) with Ja Hernandez MD   Medication Sig Dispense Refill    LIDOcaine-prilocaine (EMLA) cream use as directed      sevelamer carbonate (RENVELA) 800 mg Tab TAKE 1 TABLET BY MOUTH 3 TIMES DAILY WITH MEALS.  Strength: 800 mg 270 tablet 1    [DISCONTINUED] apixaban (ELIQUIS) 2.5 mg Tab Take 1 tablet (2.5 mg total) by mouth 2 (two) times daily. 60 tablet 0    [DISCONTINUED] carvediloL (COREG) 12.5 MG tablet Take 1 tablet (12.5 mg total) by mouth 2 (two) times daily. 180 tablet 1       Allergies  Review of patient's allergies indicates:   Allergen Reactions    Ace inhibitors Swelling    Iodine and iodide containing products Nausea And Vomiting     SHRIMP       Physical Examination     Vitals:    01/18/24 0729   BP: 108/70   Pulse: 84   Temp: 98 °F (36.7 °C)     Physical Exam  Constitutional:       General: He is not in acute distress.     Appearance: He is not ill-appearing.   HENT:      Head: Normocephalic and atraumatic.      Right Ear: Tympanic membrane and ear canal normal.      Left Ear: Tympanic membrane and ear canal normal.      Nose: Nose normal. No congestion or rhinorrhea.   Eyes:      Pupils: Pupils are equal, round, and reactive to light.   Cardiovascular:      Rate and Rhythm: Normal rate and regular rhythm.      Pulses: Normal pulses.      Heart sounds: No murmur heard.  Pulmonary:      Effort: No respiratory  distress.      Breath sounds: No wheezing, rhonchi or rales.   Abdominal:      General: Bowel sounds are normal.      Palpations: Abdomen is soft.      Tenderness: There is no abdominal tenderness.      Hernia: No hernia is present.   Musculoskeletal:      Cervical back: Normal range of motion and neck supple.   Lymphadenopathy:      Cervical: No cervical adenopathy.   Skin:     General: Skin is warm and dry.   Neurological:      Mental Status: He is alert.   Psychiatric:         Behavior: Behavior normal.         Thought Content: Thought content normal.          Assessment and Plan (including Health Maintenance)   :    Plan:         Health Maintenance Due   Topic Date Due    COVID-19 Vaccine (1) Never done    Pneumococcal Vaccines (Age 0-64) (1 - PCV) Never done    HIV Screening  Never done    TETANUS VACCINE  Never done    Hemoglobin A1c (Diabetic Prevention Screening)  Never done    Colorectal Cancer Screening  Never done    Shingles Vaccine (1 of 2) Never done    Influenza Vaccine (1) Never done       Problem List Items Addressed This Visit          Cardiac/Vascular    Hypertension - Primary    Relevant Orders    CBC Auto Differential    Comprehensive Metabolic Panel    Lipid Panel       Renal/    Renal disease    Relevant Orders    CBC Auto Differential    Comprehensive Metabolic Panel    Lipid Panel     Other Visit Diagnoses       ESRD (end stage renal disease) on dialysis        BMI 30.0-30.9,adult              Hypertension, unspecified type  -     CBC Auto Differential; Future; Expected date: 01/18/2024  -     Comprehensive Metabolic Panel; Future; Expected date: 01/18/2024  -     Lipid Panel; Future; Expected date: 01/18/2024    Renal disease  -     CBC Auto Differential; Future; Expected date: 01/18/2024  -     Comprehensive Metabolic Panel; Future; Expected date: 01/18/2024  -     Lipid Panel; Future; Expected date: 01/18/2024    ESRD (end stage renal disease) on dialysis    BMI 30.0-30.9,adult    Other  orders  -     apixaban (ELIQUIS) 2.5 mg Tab; Take 1 tablet (2.5 mg total) by mouth 2 (two) times daily.  Dispense: 180 tablet; Refill: 1  -     carvediloL (COREG) 12.5 MG tablet; Take 1 tablet (12.5 mg total) by mouth 2 (two) times daily.  Dispense: 180 tablet; Refill: 1       Health Maintenance Topics with due status: Not Due       Topic Last Completion Date    Lipid Panel 08/17/2023       Procedures     Future Appointments   Date Time Provider Department Center   1/18/2024  8:40 AM Ja Hernandez MD [facility]        No follow-ups on file.       Signature:  Ja Hernandez MD  [facility]  [address] [facility]   [place] [address]  [phone] Phone  [phone] Fax    Date of encounter: 1/18/24

## 2024-03-26 RX ORDER — SEVELAMER CARBONATE 800 MG/1
TABLET, FILM COATED ORAL
Qty: 270 TABLET | Refills: 1 | Status: SHIPPED | OUTPATIENT
Start: 2024-03-26

## 2024-04-22 RX ORDER — CARVEDILOL 3.12 MG/1
3.12 TABLET ORAL 2 TIMES DAILY WITH MEALS
COMMUNITY
End: 2024-04-22 | Stop reason: SDUPTHER

## 2024-04-22 RX ORDER — CARVEDILOL 3.12 MG/1
3.12 TABLET ORAL 2 TIMES DAILY WITH MEALS
Qty: 180 TABLET | Refills: 0 | Status: SHIPPED | OUTPATIENT
Start: 2024-04-22

## 2024-07-25 ENCOUNTER — OFFICE VISIT (OUTPATIENT)
Dept: FAMILY MEDICINE | Facility: CLINIC | Age: 59
End: 2024-07-25
Payer: COMMERCIAL

## 2024-07-25 VITALS
TEMPERATURE: 99 F | HEIGHT: 69 IN | SYSTOLIC BLOOD PRESSURE: 108 MMHG | WEIGHT: 205.19 LBS | OXYGEN SATURATION: 98 % | HEART RATE: 75 BPM | DIASTOLIC BLOOD PRESSURE: 70 MMHG | BODY MASS INDEX: 30.39 KG/M2

## 2024-07-25 DIAGNOSIS — Z12.5 SCREENING PSA (PROSTATE SPECIFIC ANTIGEN): ICD-10-CM

## 2024-07-25 DIAGNOSIS — I10 HYPERTENSION, UNSPECIFIED TYPE: Primary | ICD-10-CM

## 2024-07-25 DIAGNOSIS — Z13.1 SCREENING FOR DIABETES MELLITUS: ICD-10-CM

## 2024-07-25 LAB
ALBUMIN SERPL BCP-MCNC: 3.5 G/DL (ref 3.5–5)
ALBUMIN/GLOB SERPL: 1 {RATIO}
ALP SERPL-CCNC: 45 U/L (ref 45–115)
ALT SERPL W P-5'-P-CCNC: 14 U/L (ref 16–61)
ANION GAP SERPL CALCULATED.3IONS-SCNC: 10 MMOL/L (ref 7–16)
AST SERPL W P-5'-P-CCNC: <3 U/L (ref 15–37)
BASOPHILS # BLD AUTO: 0.02 K/UL (ref 0–0.2)
BASOPHILS NFR BLD AUTO: 0.4 % (ref 0–1)
BILIRUB SERPL-MCNC: 0.3 MG/DL (ref ?–1.2)
BUN SERPL-MCNC: 38 MG/DL (ref 7–18)
BUN/CREAT SERPL: 4 (ref 6–20)
CALCIUM SERPL-MCNC: 9 MG/DL (ref 8.5–10.1)
CHLORIDE SERPL-SCNC: 102 MMOL/L (ref 98–107)
CHOLEST SERPL-MCNC: 158 MG/DL (ref 0–200)
CHOLEST/HDLC SERPL: 3.3 {RATIO}
CO2 SERPL-SCNC: 29 MMOL/L (ref 21–32)
CREAT SERPL-MCNC: 9.43 MG/DL (ref 0.7–1.3)
DIFFERENTIAL METHOD BLD: ABNORMAL
EGFR (NO RACE VARIABLE) (RUSH/TITUS): 6 ML/MIN/1.73M2
EOSINOPHIL # BLD AUTO: 0.07 K/UL (ref 0–0.5)
EOSINOPHIL NFR BLD AUTO: 1.5 % (ref 1–4)
ERYTHROCYTE [DISTWIDTH] IN BLOOD BY AUTOMATED COUNT: 16.7 % (ref 11.5–14.5)
EST. AVERAGE GLUCOSE BLD GHB EST-MCNC: 97 MG/DL
GLOBULIN SER-MCNC: 3.6 G/DL (ref 2–4)
GLUCOSE SERPL-MCNC: 77 MG/DL (ref 74–106)
HBA1C MFR BLD HPLC: 5 % (ref 4.5–6.6)
HCT VFR BLD AUTO: 39 % (ref 40–54)
HDLC SERPL-MCNC: 48 MG/DL (ref 40–60)
HGB BLD-MCNC: 11.5 G/DL (ref 13.5–18)
IMM GRANULOCYTES # BLD AUTO: 0 K/UL (ref 0–0.04)
IMM GRANULOCYTES NFR BLD: 0 % (ref 0–0.4)
LDLC SERPL CALC-MCNC: 94 MG/DL
LDLC/HDLC SERPL: 2 {RATIO}
LYMPHOCYTES # BLD AUTO: 1.07 K/UL (ref 1–4.8)
LYMPHOCYTES NFR BLD AUTO: 23.5 % (ref 27–41)
MCH RBC QN AUTO: 25.9 PG (ref 27–31)
MCHC RBC AUTO-ENTMCNC: 29.5 G/DL (ref 32–36)
MCV RBC AUTO: 87.8 FL (ref 80–96)
MONOCYTES # BLD AUTO: 0.46 K/UL (ref 0–0.8)
MONOCYTES NFR BLD AUTO: 10.1 % (ref 2–6)
MPC BLD CALC-MCNC: 10 FL (ref 9.4–12.4)
NEUTROPHILS # BLD AUTO: 2.94 K/UL (ref 1.8–7.7)
NEUTROPHILS NFR BLD AUTO: 64.5 % (ref 53–65)
NONHDLC SERPL-MCNC: 110 MG/DL
NRBC # BLD AUTO: 0 X10E3/UL
NRBC, AUTO (.00): 0 %
PLATELET # BLD AUTO: 225 K/UL (ref 150–400)
POTASSIUM SERPL-SCNC: 4.3 MMOL/L (ref 3.5–5.1)
PROT SERPL-MCNC: 7.1 G/DL (ref 6.4–8.2)
PSA SERPL-MCNC: 3.61 NG/ML
RBC # BLD AUTO: 4.44 M/UL (ref 4.6–6.2)
SODIUM SERPL-SCNC: 137 MMOL/L (ref 136–145)
TRIGL SERPL-MCNC: 80 MG/DL (ref 35–150)
VLDLC SERPL-MCNC: 16 MG/DL
WBC # BLD AUTO: 4.56 K/UL (ref 4.5–11)

## 2024-07-25 PROCEDURE — 3008F BODY MASS INDEX DOCD: CPT | Mod: CPTII,,, | Performed by: FAMILY MEDICINE

## 2024-07-25 PROCEDURE — G0103 PSA SCREENING: HCPCS | Mod: ,,, | Performed by: CLINICAL MEDICAL LABORATORY

## 2024-07-25 PROCEDURE — 3074F SYST BP LT 130 MM HG: CPT | Mod: CPTII,,, | Performed by: FAMILY MEDICINE

## 2024-07-25 PROCEDURE — 3078F DIAST BP <80 MM HG: CPT | Mod: CPTII,,, | Performed by: FAMILY MEDICINE

## 2024-07-25 PROCEDURE — 80053 COMPREHEN METABOLIC PANEL: CPT | Mod: ,,, | Performed by: CLINICAL MEDICAL LABORATORY

## 2024-07-25 PROCEDURE — 1159F MED LIST DOCD IN RCRD: CPT | Mod: CPTII,,, | Performed by: FAMILY MEDICINE

## 2024-07-25 PROCEDURE — 99214 OFFICE O/P EST MOD 30 MIN: CPT | Mod: ,,, | Performed by: FAMILY MEDICINE

## 2024-07-25 PROCEDURE — 80061 LIPID PANEL: CPT | Mod: ,,, | Performed by: CLINICAL MEDICAL LABORATORY

## 2024-07-25 PROCEDURE — 83036 HEMOGLOBIN GLYCOSYLATED A1C: CPT | Mod: ,,, | Performed by: CLINICAL MEDICAL LABORATORY

## 2024-07-25 PROCEDURE — 85025 COMPLETE CBC W/AUTO DIFF WBC: CPT | Mod: ,,, | Performed by: CLINICAL MEDICAL LABORATORY

## 2024-07-25 PROCEDURE — 3044F HG A1C LEVEL LT 7.0%: CPT | Mod: CPTII,,, | Performed by: FAMILY MEDICINE

## 2024-07-25 RX ORDER — DIPHENHYDRAMINE HYDROCHLORIDE 25 MG/1
25 CAPSULE ORAL
COMMUNITY
Start: 2024-05-21

## 2024-07-25 RX ORDER — CARVEDILOL 3.12 MG/1
3.12 TABLET ORAL 2 TIMES DAILY WITH MEALS
Qty: 180 TABLET | Refills: 1 | Status: SHIPPED | OUTPATIENT
Start: 2024-07-25

## 2024-07-25 RX ORDER — SEVELAMER CARBONATE 800 MG/1
TABLET, FILM COATED ORAL
Qty: 270 TABLET | Refills: 1 | Status: SHIPPED | OUTPATIENT
Start: 2024-07-25

## 2024-07-25 NOTE — PROGRESS NOTES
Ja Hernandez MD   [facility] [place]  [address] [place] [address]     PATIENT NAME: Catalina Stephens  : 1965  DATE: 24  MRN: [id number]      Billing Provider: Ja Hernandez MD  Level of Service: [place] OFFICE/OUTPT VISIT, EST, LEVL IV, 30-39 MIN  Patient PCP Information       Provider PCP Type    Ja Hernandez MD General            Reason for Visit / Chief Complaint: Hypertension (Check up, labs, refills)         History of Present Illness / Problem Focused Workflow     Catalina Stephens presents to the clinic with Hypertension (Check up, labs, refills)     HPI    Review of Systems     Review of Systems   Constitutional:  Negative for activity change, appetite change, fatigue and fever.   HENT:  Negative for nasal congestion, ear pain, hearing loss, sinus pressure/congestion and sore throat.    Respiratory:  Negative for cough, chest tightness and shortness of breath.    Cardiovascular:  Negative for chest pain and palpitations.   Gastrointestinal:  Negative for abdominal pain and fecal incontinence.   Genitourinary:  Negative for bladder incontinence, difficulty urinating and erectile dysfunction.   Musculoskeletal:  Negative for arthralgias.   Integumentary:  Negative for rash.   Neurological:  Negative for dizziness and headaches.        Medical / Social / Family History     Past Medical History:   Diagnosis Date    CHF (congestive heart failure)     Chronic systolic (congestive) heart failure     Hypertension     Mild intermittent asthma, uncomplicated     Polycystic kidney disease        Past Surgical History:   Procedure Laterality Date    CENTRAL VENOUS CATHETER INSERTION  2022    Right subclavian dialysis catheter    Left AV Graft  2022    Raymundo New, DO    LEFT HEART CATHETERIZATION         Social History  Catalina Stephens  reports that he has never smoked. He has never used smokeless tobacco. He reports that he does not currently use  alcohol. He reports that he does not use drugs.    Family History  Catalina Stephens  family history includes Heart attack in his father; Heart disease in his father; Kidney disease in his father; No Known Problems in his mother; Stroke in his maternal grandfather.    Medications and Allergies     Medications  Outpatient Medications Marked as Taking for the 7/25/24 encounter (Office Visit) with Ja Hernandez MD   Medication Sig Dispense Refill    ALLERGY, DIPHENHYDRAMINE, 25 mg capsule Take 25 mg by mouth.      LIDOcaine-prilocaine (EMLA) cream use as directed      [DISCONTINUED] apixaban (ELIQUIS) 2.5 mg Tab Take 1 tablet (2.5 mg total) by mouth 2 (two) times daily. 180 tablet 1    [DISCONTINUED] carvediloL (COREG) 3.125 MG tablet Take 1 tablet (3.125 mg total) by mouth 2 (two) times daily with meals. 180 tablet 0    [DISCONTINUED] sevelamer carbonate (RENVELA) 800 mg Tab TAKE 1 TABLET BY MOUTH 3 TIMES A DAY WITH MEALS 270 tablet 1       Allergies  Review of patient's allergies indicates:   Allergen Reactions    Ace inhibitors Swelling    Iodine and iodide containing products Nausea And Vomiting     SHRIMP       Physical Examination     Vitals:    07/25/24 1330   BP: 108/70   Pulse: 75   Temp: 98.8 °F (37.1 °C)     Physical Exam  Constitutional:       General: He is not in acute distress.     Appearance: He is not ill-appearing.   HENT:      Head: Normocephalic and atraumatic.      Right Ear: Tympanic membrane and ear canal normal.      Left Ear: Tympanic membrane and ear canal normal.      Nose: Nose normal. No congestion or rhinorrhea.   Eyes:      Pupils: Pupils are equal, round, and reactive to light.   Cardiovascular:      Rate and Rhythm: Normal rate and regular rhythm.      Pulses: Normal pulses.      Heart sounds: No murmur heard.  Pulmonary:      Effort: No respiratory distress.      Breath sounds: No wheezing, rhonchi or rales.   Abdominal:      General: Bowel sounds are normal.      Palpations:  Abdomen is soft.      Tenderness: There is no abdominal tenderness.      Hernia: No hernia is present.   Musculoskeletal:      Cervical back: Normal range of motion and neck supple.   Lymphadenopathy:      Cervical: No cervical adenopathy.   Skin:     General: Skin is warm and dry.   Neurological:      Mental Status: He is alert.   Psychiatric:         Behavior: Behavior normal.         Thought Content: Thought content normal.          Assessment and Plan (including Health Maintenance)   :    Plan:         Health Maintenance Due   Topic Date Due    HIV Screening  Never done    TETANUS VACCINE  Never done    Colorectal Cancer Screening  Never done    Shingles Vaccine (1 of 2) Never done    COVID-19 Vaccine (1 - 2023-24 season) Never done       Problem List Items Addressed This Visit          Cardiac/Vascular    Hypertension - Primary    Relevant Orders    CBC Auto Differential (Completed)    Comprehensive Metabolic Panel (Completed)    Lipid Panel (Completed)     Other Visit Diagnoses       Screening PSA (prostate specific antigen)        Relevant Orders    PSA, Screening (Completed)    Screening for diabetes mellitus        Relevant Orders    Hemoglobin A1C (Completed)          Hypertension, unspecified type  -     CBC Auto Differential; Future; Expected date: 07/25/2024  -     Comprehensive Metabolic Panel; Future; Expected date: 07/25/2024  -     Lipid Panel; Future; Expected date: 07/25/2024    Screening PSA (prostate specific antigen)  -     PSA, Screening; Future; Expected date: 07/25/2024    Screening for diabetes mellitus  -     Hemoglobin A1C; Future; Expected date: 07/25/2024    Other orders  -     carvediloL (COREG) 3.125 MG tablet; Take 1 tablet (3.125 mg total) by mouth 2 (two) times daily with meals.  Dispense: 180 tablet; Refill: 1  -     sevelamer carbonate (RENVELA) 800 mg Tab; TAKE 1 TABLET BY MOUTH 3 TIMES A DAY WITH MEALS  Dispense: 270 tablet; Refill: 1  -     apixaban (ELIQUIS) 2.5 mg Tab; Take  1 tablet (2.5 mg total) by mouth 2 (two) times daily.  Dispense: 180 tablet; Refill: 1       Health Maintenance Topics with due status: Not Due       Topic Last Completion Date    Hemoglobin A1c (Diabetic Prevention Screening) 07/25/2024    Lipid Panel 07/25/2024    Influenza Vaccine Not Due       Procedures     Future Appointments   Date Time Provider Department Center   1/28/2025  9:00 AM Ja Hernandez MD [facility]        No follow-ups on file.       Signature:  Ja Hernandez MD  [facility]  [address] [place] [address]  [phone] Phone  [phone] Fax    Date of encounter: 7/25/24

## 2025-01-28 ENCOUNTER — OFFICE VISIT (OUTPATIENT)
Dept: FAMILY MEDICINE | Facility: CLINIC | Age: 60
End: 2025-01-28
Payer: COMMERCIAL

## 2025-01-28 VITALS
BODY MASS INDEX: 29.77 KG/M2 | HEART RATE: 74 BPM | SYSTOLIC BLOOD PRESSURE: 108 MMHG | HEIGHT: 69 IN | WEIGHT: 201 LBS | OXYGEN SATURATION: 99 % | TEMPERATURE: 99 F | DIASTOLIC BLOOD PRESSURE: 74 MMHG

## 2025-01-28 DIAGNOSIS — N18.6 ESRD (END STAGE RENAL DISEASE) ON DIALYSIS: ICD-10-CM

## 2025-01-28 DIAGNOSIS — Z99.2 ESRD (END STAGE RENAL DISEASE) ON DIALYSIS: ICD-10-CM

## 2025-01-28 DIAGNOSIS — I10 HYPERTENSION, UNSPECIFIED TYPE: Primary | ICD-10-CM

## 2025-01-28 LAB
ALBUMIN SERPL BCP-MCNC: 3.8 G/DL (ref 3.5–5)
ALBUMIN/GLOB SERPL: 1.2 {RATIO}
ALP SERPL-CCNC: 71 U/L (ref 40–150)
ALT SERPL W P-5'-P-CCNC: 8 U/L
ANION GAP SERPL CALCULATED.3IONS-SCNC: 14 MMOL/L (ref 7–16)
AST SERPL W P-5'-P-CCNC: 9 U/L (ref 5–34)
BASOPHILS # BLD AUTO: 0.02 K/UL (ref 0–0.2)
BASOPHILS NFR BLD AUTO: 0.6 % (ref 0–1)
BILIRUB SERPL-MCNC: 0.6 MG/DL
BUN SERPL-MCNC: 28 MG/DL (ref 8–26)
BUN/CREAT SERPL: 3 (ref 6–20)
CALCIUM SERPL-MCNC: 9.5 MG/DL (ref 8.4–10.2)
CHLORIDE SERPL-SCNC: 103 MMOL/L (ref 98–107)
CHOLEST SERPL-MCNC: 149 MG/DL
CHOLEST/HDLC SERPL: 3.7 {RATIO}
CO2 SERPL-SCNC: 26 MMOL/L (ref 22–29)
CREAT SERPL-MCNC: 9.54 MG/DL (ref 0.72–1.25)
DIFFERENTIAL METHOD BLD: ABNORMAL
EGFR (NO RACE VARIABLE) (RUSH/TITUS): 6 ML/MIN/1.73M2
EOSINOPHIL # BLD AUTO: 0.04 K/UL (ref 0–0.5)
EOSINOPHIL NFR BLD AUTO: 1.1 % (ref 1–4)
ERYTHROCYTE [DISTWIDTH] IN BLOOD BY AUTOMATED COUNT: 18.6 % (ref 11.5–14.5)
GLOBULIN SER-MCNC: 3.1 G/DL (ref 2–4)
GLUCOSE SERPL-MCNC: 73 MG/DL (ref 74–100)
HCT VFR BLD AUTO: 40.7 % (ref 40–54)
HDLC SERPL-MCNC: 40 MG/DL (ref 35–60)
HGB BLD-MCNC: 11.9 G/DL (ref 13.5–18)
IMM GRANULOCYTES # BLD AUTO: 0.01 K/UL (ref 0–0.04)
IMM GRANULOCYTES NFR BLD: 0.3 % (ref 0–0.4)
LDLC SERPL CALC-MCNC: 89 MG/DL
LDLC/HDLC SERPL: 2.2 {RATIO}
LYMPHOCYTES # BLD AUTO: 0.83 K/UL (ref 1–4.8)
LYMPHOCYTES NFR BLD AUTO: 23.6 % (ref 27–41)
MCH RBC QN AUTO: 26.5 PG (ref 27–31)
MCHC RBC AUTO-ENTMCNC: 29.2 G/DL (ref 32–36)
MCV RBC AUTO: 90.6 FL (ref 80–96)
MONOCYTES # BLD AUTO: 0.32 K/UL (ref 0–0.8)
MONOCYTES NFR BLD AUTO: 9.1 % (ref 2–6)
MPC BLD CALC-MCNC: 11 FL (ref 9.4–12.4)
NEUTROPHILS # BLD AUTO: 2.3 K/UL (ref 1.8–7.7)
NEUTROPHILS NFR BLD AUTO: 65.3 % (ref 53–65)
NONHDLC SERPL-MCNC: 109 MG/DL
NRBC # BLD AUTO: 0 X10E3/UL
NRBC, AUTO (.00): 0 %
PLATELET # BLD AUTO: 202 K/UL (ref 150–400)
POTASSIUM SERPL-SCNC: 3.9 MMOL/L (ref 3.5–5.1)
PROT SERPL-MCNC: 6.9 G/DL (ref 6.4–8.3)
RBC # BLD AUTO: 4.49 M/UL (ref 4.6–6.2)
SODIUM SERPL-SCNC: 139 MMOL/L (ref 136–145)
TRIGL SERPL-MCNC: 99 MG/DL (ref 34–140)
VLDLC SERPL-MCNC: 20 MG/DL
WBC # BLD AUTO: 3.52 K/UL (ref 4.5–11)

## 2025-01-28 PROCEDURE — 85025 COMPLETE CBC W/AUTO DIFF WBC: CPT | Mod: ,,, | Performed by: CLINICAL MEDICAL LABORATORY

## 2025-01-28 PROCEDURE — 99214 OFFICE O/P EST MOD 30 MIN: CPT | Mod: ,,, | Performed by: FAMILY MEDICINE

## 2025-01-28 PROCEDURE — 80061 LIPID PANEL: CPT | Mod: ,,, | Performed by: CLINICAL MEDICAL LABORATORY

## 2025-01-28 PROCEDURE — 80053 COMPREHEN METABOLIC PANEL: CPT | Mod: ,,, | Performed by: CLINICAL MEDICAL LABORATORY

## 2025-01-28 RX ORDER — CARVEDILOL 3.12 MG/1
3.12 TABLET ORAL 2 TIMES DAILY WITH MEALS
Qty: 180 TABLET | Refills: 1 | Status: SHIPPED | OUTPATIENT
Start: 2025-01-28

## 2025-01-28 RX ORDER — SEVELAMER CARBONATE 800 MG/1
TABLET, FILM COATED ORAL
Qty: 270 TABLET | Refills: 1 | Status: SHIPPED | OUTPATIENT
Start: 2025-01-28

## 2025-01-28 NOTE — PROGRESS NOTES
Ja Hernandez MD   [facility]  [address] [place] [address]     PATIENT NAME: Catalina Stephens  : 1965  DATE: 25  MRN: [id number]      Billing Provider: Ja Hernandez MD  Level of Service: [place] OFFICE/OUTPT VISIT, EST, LEVL IV, 30-39 MIN  Patient PCP Information       Provider PCP Type    Ja Hernandez MD General            Reason for Visit / Chief Complaint: Hypertension (6 month check up, labs and med refills) and Chronic Renal Failure (Dialysis on Monday, Wednesday and Friday)         History of Present Illness / Problem Focused Workflow     Catalina Stephens presents to the clinic with Hypertension (6 month check up, labs and med refills) and Chronic Renal Failure (Dialysis on Monday, Wednesday and Friday)     HPI    Review of Systems     Review of Systems   Constitutional:  Negative for activity change, appetite change, fatigue and fever.   HENT:  Negative for nasal congestion, ear pain, hearing loss, sinus pressure/congestion and sore throat.    Respiratory:  Negative for cough, chest tightness and shortness of breath.    Cardiovascular:  Negative for chest pain and palpitations.   Gastrointestinal:  Negative for abdominal pain and fecal incontinence.   Genitourinary:  Negative for bladder incontinence, difficulty urinating and erectile dysfunction.   Musculoskeletal:  Negative for arthralgias.   Integumentary:  Negative for rash.   Neurological:  Negative for dizziness and headaches.        Medical / Social / Family History     Past Medical History:   Diagnosis Date    CHF (congestive heart failure)     Chronic systolic (congestive) heart failure     Hypertension     Mild intermittent asthma, uncomplicated     Polycystic kidney disease        Past Surgical History:   Procedure Laterality Date    CENTRAL VENOUS CATHETER INSERTION  2022    Right subclavian dialysis catheter    Left AV Graft  2022    Raymundo New DO    LEFT HEART  CATHETERIZATION         Social History  Catalina Stephens  reports that he has never smoked. He has never used smokeless tobacco. He reports that he does not currently use alcohol. He reports that he does not use drugs.    Family History  Catalina Stephens  family history includes Heart attack in his father; Heart disease in his father; Kidney disease in his father; No Known Problems in his mother; Stroke in his maternal grandfather.    Medications and Allergies     Medications  Outpatient Medications Marked as Taking for the 1/28/25 encounter (Office Visit) with Ja Hernandez MD   Medication Sig Dispense Refill    ALLERGY, DIPHENHYDRAMINE, 25 mg capsule Take 25 mg by mouth.      [DISCONTINUED] apixaban (ELIQUIS) 2.5 mg Tab Take 1 tablet (2.5 mg total) by mouth 2 (two) times daily. 180 tablet 1    [DISCONTINUED] carvediloL (COREG) 3.125 MG tablet Take 1 tablet (3.125 mg total) by mouth 2 (two) times daily with meals. 180 tablet 1    [DISCONTINUED] sevelamer carbonate (RENVELA) 800 mg Tab TAKE 1 TABLET BY MOUTH 3 TIMES A DAY WITH MEALS 270 tablet 1       Allergies  Review of patient's allergies indicates:   Allergen Reactions    Ace inhibitors Swelling    Iodine and iodide containing products Nausea And Vomiting     SHRIMP       Physical Examination     Vitals:    01/28/25 0720   BP: 108/74   Pulse: 74   Temp: 99.1 °F (37.3 °C)     Physical Exam  Constitutional:       General: He is not in acute distress.     Appearance: He is not ill-appearing.   HENT:      Head: Normocephalic and atraumatic.      Right Ear: Tympanic membrane and ear canal normal.      Left Ear: Tympanic membrane and ear canal normal.      Nose: Nose normal. No congestion or rhinorrhea.   Eyes:      Pupils: Pupils are equal, round, and reactive to light.   Cardiovascular:      Rate and Rhythm: Normal rate and regular rhythm.      Pulses: Normal pulses.      Heart sounds: No murmur heard.  Pulmonary:      Effort: No respiratory distress.       Breath sounds: No wheezing, rhonchi or rales.   Abdominal:      General: Bowel sounds are normal.      Palpations: Abdomen is soft.      Tenderness: There is no abdominal tenderness.      Hernia: No hernia is present.   Musculoskeletal:      Cervical back: Normal range of motion and neck supple.   Lymphadenopathy:      Cervical: No cervical adenopathy.   Skin:     General: Skin is warm and dry.   Neurological:      Mental Status: He is alert.   Psychiatric:         Behavior: Behavior normal.         Thought Content: Thought content normal.          Assessment and Plan (including Health Maintenance)   :    Plan:         Health Maintenance Due   Topic Date Due    HIV Screening  Never done    TETANUS VACCINE  Never done    High Dose Statin  Never done    Colorectal Cancer Screening  Never done    Shingles Vaccine (1 of 2) Never done    Pneumococcal Vaccines (Age 50+) (1 of 1 - PCV) Never done       Problem List Items Addressed This Visit          Cardiac/Vascular    Hypertension - Primary    Relevant Orders    CBC Auto Differential    Comprehensive Metabolic Panel    Lipid Panel     Other Visit Diagnoses       ESRD (end stage renal disease) on dialysis        BMI 30.0-30.9,adult              Hypertension, unspecified type  -     CBC Auto Differential; Future; Expected date: 01/28/2025  -     Comprehensive Metabolic Panel; Future; Expected date: 01/28/2025  -     Lipid Panel; Future; Expected date: 01/28/2025    ESRD (end stage renal disease) on dialysis    BMI 30.0-30.9,adult    Other orders  -     sevelamer carbonate (RENVELA) 800 mg Tab; TAKE 1 TABLET BY MOUTH 3 TIMES A DAY WITH MEALS  Dispense: 270 tablet; Refill: 1  -     apixaban (ELIQUIS) 2.5 mg Tab; Take 1 tablet (2.5 mg total) by mouth 2 (two) times daily.  Dispense: 180 tablet; Refill: 1  -     carvediloL (COREG) 3.125 MG tablet; Take 1 tablet (3.125 mg total) by mouth 2 (two) times daily with meals.  Dispense: 180 tablet; Refill: 1       Health Maintenance  Topics with due status: Not Due       Topic Last Completion Date    Hemoglobin A1c (Diabetic Prevention Screening) 07/25/2024    Lipid Panel 07/25/2024    RSV Vaccine (Age 60+ and Pregnant patients) Not Due       Procedures     Future Appointments   Date Time Provider Department Center   1/28/2025  9:00 AM Ja Hernandez MD [facility] [place]   7/29/2025  8:00 AM [facility] [place]        No follow-ups on file.       Signature:  Ja Hernandez MD  [facility]  [address] [place] [address]  [phone] Phone  [phone] Fax    Date of encounter: 1/28/25

## 2025-04-14 ENCOUNTER — OFFICE VISIT (OUTPATIENT)
Dept: FAMILY MEDICINE | Facility: CLINIC | Age: 60
End: 2025-04-14
Payer: COMMERCIAL

## 2025-04-14 VITALS
HEIGHT: 69 IN | WEIGHT: 201 LBS | DIASTOLIC BLOOD PRESSURE: 76 MMHG | TEMPERATURE: 99 F | OXYGEN SATURATION: 98 % | BODY MASS INDEX: 29.77 KG/M2 | SYSTOLIC BLOOD PRESSURE: 112 MMHG | HEART RATE: 74 BPM

## 2025-04-14 DIAGNOSIS — R05.9 COUGH, UNSPECIFIED TYPE: ICD-10-CM

## 2025-04-14 DIAGNOSIS — J01.00 ACUTE NON-RECURRENT MAXILLARY SINUSITIS: Primary | ICD-10-CM

## 2025-04-14 PROCEDURE — 99213 OFFICE O/P EST LOW 20 MIN: CPT | Mod: 25,,, | Performed by: FAMILY MEDICINE

## 2025-04-14 PROCEDURE — 96372 THER/PROPH/DIAG INJ SC/IM: CPT | Mod: ,,, | Performed by: FAMILY MEDICINE

## 2025-04-14 PROCEDURE — 1159F MED LIST DOCD IN RCRD: CPT | Mod: CPTII,,, | Performed by: FAMILY MEDICINE

## 2025-04-14 PROCEDURE — 3074F SYST BP LT 130 MM HG: CPT | Mod: CPTII,,, | Performed by: FAMILY MEDICINE

## 2025-04-14 PROCEDURE — 3008F BODY MASS INDEX DOCD: CPT | Mod: CPTII,,, | Performed by: FAMILY MEDICINE

## 2025-04-14 PROCEDURE — 3078F DIAST BP <80 MM HG: CPT | Mod: CPTII,,, | Performed by: FAMILY MEDICINE

## 2025-04-14 RX ORDER — DEXAMETHASONE SODIUM PHOSPHATE 4 MG/ML
4 INJECTION, SOLUTION INTRA-ARTICULAR; INTRALESIONAL; INTRAMUSCULAR; INTRAVENOUS; SOFT TISSUE
Status: COMPLETED | OUTPATIENT
Start: 2025-04-14 | End: 2025-04-14

## 2025-04-14 RX ORDER — BROMPHENIRAMINE MALEATE, PSEUDOEPHEDRINE HYDROCHLORIDE, AND DEXTROMETHORPHAN HYDROBROMIDE 2; 30; 10 MG/5ML; MG/5ML; MG/5ML
10 SYRUP ORAL EVERY 4 HOURS PRN
Qty: 240 ML | Refills: 0 | Status: SHIPPED | OUTPATIENT
Start: 2025-04-14 | End: 2025-04-24

## 2025-04-14 RX ORDER — METHYLPREDNISOLONE ACETATE 80 MG/ML
40 INJECTION, SUSPENSION INTRA-ARTICULAR; INTRALESIONAL; INTRAMUSCULAR; SOFT TISSUE
Status: COMPLETED | OUTPATIENT
Start: 2025-04-14 | End: 2025-04-14

## 2025-04-14 RX ORDER — CEFTRIAXONE 1 G/1
1 INJECTION, POWDER, FOR SOLUTION INTRAMUSCULAR; INTRAVENOUS
Status: COMPLETED | OUTPATIENT
Start: 2025-04-14 | End: 2025-04-14

## 2025-04-14 RX ORDER — AZITHROMYCIN 250 MG/1
TABLET, FILM COATED ORAL
Qty: 6 TABLET | Refills: 0 | Status: SHIPPED | OUTPATIENT
Start: 2025-04-14

## 2025-04-14 RX ADMIN — METHYLPREDNISOLONE ACETATE 40 MG: 80 INJECTION, SUSPENSION INTRA-ARTICULAR; INTRALESIONAL; INTRAMUSCULAR; SOFT TISSUE at 01:04

## 2025-04-14 RX ADMIN — CEFTRIAXONE 1 G: 1 INJECTION, POWDER, FOR SOLUTION INTRAMUSCULAR; INTRAVENOUS at 01:04

## 2025-04-14 RX ADMIN — DEXAMETHASONE SODIUM PHOSPHATE 4 MG: 4 INJECTION, SOLUTION INTRA-ARTICULAR; INTRALESIONAL; INTRAMUSCULAR; INTRAVENOUS; SOFT TISSUE at 01:04

## 2025-04-14 NOTE — PROGRESS NOTES
Ja Hernandez MD   [facility]  [address] [place] [address]     PATIENT NAME: Catalina Stephens  : 1965  DATE: 25  MRN: [id number]      Billing Provider: Ja Hernandez MD  Level of Service: [place] OFFICE/OUTPT VISIT, EST, LEVL III, 20-29 MIN  Patient PCP Information       Provider PCP Type    Ja Hernandez MD General            Reason for Visit / Chief Complaint: Sinus Problem (Cough, postnasal drip, runny nose - started Saturday)         History of Present Illness / Problem Focused Workflow     Catalina Stephens presents to the clinic with Sinus Problem (Cough, postnasal drip, runny nose - started Saturday)     HPI    Review of Systems     Review of Systems     Medical / Social / Family History     Past Medical History:   Diagnosis Date    CHF (congestive heart failure)     Chronic systolic (congestive) heart failure     Hypertension     Mild intermittent asthma, uncomplicated     Polycystic kidney disease        Past Surgical History:   Procedure Laterality Date    CENTRAL VENOUS CATHETER INSERTION  2022    Right subclavian dialysis catheter    Left AV Graft  2022    Raymundo New DO    LEFT HEART CATHETERIZATION         Social History  Catalina Stephens  reports that he has never smoked. He has never used smokeless tobacco. He reports that he does not currently use alcohol. He reports that he does not use drugs.    Family History  Catalina Stephens  family history includes Heart attack in his father; Heart disease in his father; Kidney disease in his father; No Known Problems in his mother; Stroke in his maternal grandfather.    Medications and Allergies     Medications  Outpatient Medications Marked as Taking for the 25 encounter (Office Visit) with Ja Hernandez MD   Medication Sig Dispense Refill    apixaban (ELIQUIS) 2.5 mg Tab Take 1 tablet (2.5 mg total) by mouth 2 (two) times daily. 180 tablet 1    carvediloL (COREG) 3.125 MG  tablet Take 1 tablet (3.125 mg total) by mouth 2 (two) times daily with meals. 180 tablet 1    sevelamer carbonate (RENVELA) 800 mg Tab TAKE 1 TABLET BY MOUTH 3 TIMES A DAY WITH MEALS 270 tablet 1       Allergies  Review of patient's allergies indicates:   Allergen Reactions    Ace inhibitors Swelling    Iodine and iodide containing products Nausea And Vomiting     SHRIMP       Physical Examination     Vitals:    04/14/25 1310   BP: 112/76   Pulse: 74   Temp: 98.8 °F (37.1 °C)     Physical Exam  Constitutional:       General: He is not in acute distress.     Appearance: He is not ill-appearing.   HENT:      Head: Normocephalic and atraumatic.      Right Ear: Tympanic membrane and ear canal normal.      Left Ear: Tympanic membrane and ear canal normal.      Nose: Congestion and rhinorrhea present.   Eyes:      Pupils: Pupils are equal, round, and reactive to light.   Cardiovascular:      Rate and Rhythm: Normal rate and regular rhythm.      Pulses: Normal pulses.      Heart sounds: No murmur heard.  Pulmonary:      Effort: No respiratory distress.      Breath sounds: No wheezing, rhonchi or rales.   Abdominal:      General: Bowel sounds are normal.      Palpations: Abdomen is soft.      Tenderness: There is no abdominal tenderness.      Hernia: No hernia is present.   Musculoskeletal:      Cervical back: Normal range of motion and neck supple.   Lymphadenopathy:      Cervical: No cervical adenopathy.   Skin:     General: Skin is warm and dry.   Neurological:      Mental Status: He is alert.   Psychiatric:         Behavior: Behavior normal.         Thought Content: Thought content normal.          Assessment and Plan (including Health Maintenance)   :    Plan:         Health Maintenance Due   Topic Date Due    HIV Screening  Never done    TETANUS VACCINE  Never done    Colorectal Cancer Screening  Never done    Shingles Vaccine (1 of 2) Never done    Pneumococcal Vaccines (Age 50+) (1 of 1 - PCV) Never done        Problem List Items Addressed This Visit    None  Visit Diagnoses         Acute non-recurrent maxillary sinusitis    -  Primary    Relevant Medications    dexAMETHasone injection 4 mg (Completed)    methylPREDNISolone acetate injection 40 mg (Completed)    cefTRIAXone injection 1 g (Completed)    azithromycin (Z-LAURA) 250 MG tablet      Cough, unspecified type              Acute non-recurrent maxillary sinusitis  -     dexAMETHasone injection 4 mg  -     methylPREDNISolone acetate injection 40 mg  -     cefTRIAXone injection 1 g  -     brompheniramine-pseudoeph-DM (BROMFED DM) 2-30-10 mg/5 mL Syrp; Take 10 mLs by mouth every 4 (four) hours as needed (cough).  Dispense: 240 mL; Refill: 0  -     azithromycin (Z-LAURA) 250 MG tablet; Take 2 tablets by mouth on day 1; Take 1 tablet by mouth on days 2-5  Dispense: 6 tablet; Refill: 0    Cough, unspecified type       Health Maintenance Topics with due status: Not Due       Topic Last Completion Date    Hemoglobin A1c (Diabetic Prevention Screening) 07/25/2024    Lipid Panel 01/28/2025    RSV Vaccine (Age 60+ and Pregnant patients) Not Due       Procedures     Future Appointments   Date Time Provider Department Center   7/29/2025  8:00 AM [facility]        No follow-ups on file.       Signature:  Ja Hernandez MD  [facility]  [address] [place]   [place] [address]  [phone] Phone  [phone] Fax    Date of encounter: 4/14/25

## 2025-05-19 ENCOUNTER — OFFICE VISIT (OUTPATIENT)
Dept: FAMILY MEDICINE | Facility: CLINIC | Age: 60
End: 2025-05-19
Payer: COMMERCIAL

## 2025-05-19 VITALS
TEMPERATURE: 101 F | OXYGEN SATURATION: 97 % | HEIGHT: 69 IN | DIASTOLIC BLOOD PRESSURE: 78 MMHG | SYSTOLIC BLOOD PRESSURE: 124 MMHG | BODY MASS INDEX: 29.47 KG/M2 | HEART RATE: 123 BPM | WEIGHT: 199 LBS

## 2025-05-19 DIAGNOSIS — J01.00 ACUTE NON-RECURRENT MAXILLARY SINUSITIS: Primary | ICD-10-CM

## 2025-05-19 DIAGNOSIS — R05.1 ACUTE COUGH: ICD-10-CM

## 2025-05-19 PROCEDURE — 99213 OFFICE O/P EST LOW 20 MIN: CPT | Mod: 25,,, | Performed by: FAMILY MEDICINE

## 2025-05-19 PROCEDURE — 1159F MED LIST DOCD IN RCRD: CPT | Mod: CPTII,,, | Performed by: FAMILY MEDICINE

## 2025-05-19 PROCEDURE — 3074F SYST BP LT 130 MM HG: CPT | Mod: CPTII,,, | Performed by: FAMILY MEDICINE

## 2025-05-19 PROCEDURE — 96372 THER/PROPH/DIAG INJ SC/IM: CPT | Mod: ,,, | Performed by: FAMILY MEDICINE

## 2025-05-19 PROCEDURE — 3008F BODY MASS INDEX DOCD: CPT | Mod: CPTII,,, | Performed by: FAMILY MEDICINE

## 2025-05-19 PROCEDURE — 3078F DIAST BP <80 MM HG: CPT | Mod: CPTII,,, | Performed by: FAMILY MEDICINE

## 2025-05-19 RX ORDER — CEFTRIAXONE 1 G/1
1 INJECTION, POWDER, FOR SOLUTION INTRAMUSCULAR; INTRAVENOUS
Status: COMPLETED | OUTPATIENT
Start: 2025-05-19 | End: 2025-05-19

## 2025-05-19 RX ORDER — METHYLPREDNISOLONE ACETATE 80 MG/ML
40 INJECTION, SUSPENSION INTRA-ARTICULAR; INTRALESIONAL; INTRAMUSCULAR; SOFT TISSUE
Status: COMPLETED | OUTPATIENT
Start: 2025-05-19 | End: 2025-05-19

## 2025-05-19 RX ORDER — CEFUROXIME AXETIL 250 MG/1
250 TABLET ORAL EVERY 12 HOURS
Qty: 20 TABLET | Refills: 0 | Status: SHIPPED | OUTPATIENT
Start: 2025-05-19

## 2025-05-19 RX ORDER — DEXAMETHASONE SODIUM PHOSPHATE 4 MG/ML
4 INJECTION, SOLUTION INTRA-ARTICULAR; INTRALESIONAL; INTRAMUSCULAR; INTRAVENOUS; SOFT TISSUE
Status: COMPLETED | OUTPATIENT
Start: 2025-05-19 | End: 2025-05-19

## 2025-05-19 RX ADMIN — CEFTRIAXONE 1 G: 1 INJECTION, POWDER, FOR SOLUTION INTRAMUSCULAR; INTRAVENOUS at 02:05

## 2025-05-19 RX ADMIN — DEXAMETHASONE SODIUM PHOSPHATE 4 MG: 4 INJECTION, SOLUTION INTRA-ARTICULAR; INTRALESIONAL; INTRAMUSCULAR; INTRAVENOUS; SOFT TISSUE at 02:05

## 2025-05-19 RX ADMIN — METHYLPREDNISOLONE ACETATE 40 MG: 80 INJECTION, SUSPENSION INTRA-ARTICULAR; INTRALESIONAL; INTRAMUSCULAR; SOFT TISSUE at 02:05

## 2025-08-12 RX ORDER — CARVEDILOL 3.12 MG/1
3.12 TABLET ORAL 2 TIMES DAILY WITH MEALS
Qty: 180 TABLET | Refills: 1 | Status: SHIPPED | OUTPATIENT
Start: 2025-08-12

## 2025-09-02 ENCOUNTER — OFFICE VISIT (OUTPATIENT)
Dept: FAMILY MEDICINE | Facility: CLINIC | Age: 60
End: 2025-09-02
Payer: COMMERCIAL

## 2025-09-02 VITALS
TEMPERATURE: 99 F | BODY MASS INDEX: 29.92 KG/M2 | HEIGHT: 69 IN | HEART RATE: 93 BPM | DIASTOLIC BLOOD PRESSURE: 68 MMHG | OXYGEN SATURATION: 98 % | WEIGHT: 202 LBS | SYSTOLIC BLOOD PRESSURE: 108 MMHG

## 2025-09-02 DIAGNOSIS — J01.00 ACUTE NON-RECURRENT MAXILLARY SINUSITIS: Primary | ICD-10-CM

## 2025-09-02 PROCEDURE — 3078F DIAST BP <80 MM HG: CPT | Mod: CPTII,,, | Performed by: FAMILY MEDICINE

## 2025-09-02 PROCEDURE — 96372 THER/PROPH/DIAG INJ SC/IM: CPT | Mod: ,,, | Performed by: FAMILY MEDICINE

## 2025-09-02 PROCEDURE — 1159F MED LIST DOCD IN RCRD: CPT | Mod: CPTII,,, | Performed by: FAMILY MEDICINE

## 2025-09-02 PROCEDURE — 99213 OFFICE O/P EST LOW 20 MIN: CPT | Mod: 25,,, | Performed by: FAMILY MEDICINE

## 2025-09-02 PROCEDURE — 3074F SYST BP LT 130 MM HG: CPT | Mod: CPTII,,, | Performed by: FAMILY MEDICINE

## 2025-09-02 PROCEDURE — 3008F BODY MASS INDEX DOCD: CPT | Mod: CPTII,,, | Performed by: FAMILY MEDICINE

## 2025-09-02 RX ORDER — CEFTRIAXONE 1 G/1
1 INJECTION, POWDER, FOR SOLUTION INTRAMUSCULAR; INTRAVENOUS
Status: COMPLETED | OUTPATIENT
Start: 2025-09-02 | End: 2025-09-02

## 2025-09-02 RX ORDER — DEXAMETHASONE SODIUM PHOSPHATE 4 MG/ML
4 INJECTION, SOLUTION INTRA-ARTICULAR; INTRALESIONAL; INTRAMUSCULAR; INTRAVENOUS; SOFT TISSUE
Status: COMPLETED | OUTPATIENT
Start: 2025-09-02 | End: 2025-09-02

## 2025-09-02 RX ORDER — METHYLPREDNISOLONE ACETATE 80 MG/ML
40 INJECTION, SUSPENSION INTRA-ARTICULAR; INTRALESIONAL; INTRAMUSCULAR; SOFT TISSUE
Status: COMPLETED | OUTPATIENT
Start: 2025-09-02 | End: 2025-09-02

## 2025-09-02 RX ORDER — AZITHROMYCIN 250 MG/1
TABLET, FILM COATED ORAL
Qty: 6 TABLET | Refills: 0 | Status: SHIPPED | OUTPATIENT
Start: 2025-09-02

## 2025-09-02 RX ADMIN — METHYLPREDNISOLONE ACETATE 40 MG: 80 INJECTION, SUSPENSION INTRA-ARTICULAR; INTRALESIONAL; INTRAMUSCULAR; SOFT TISSUE at 07:09

## 2025-09-02 RX ADMIN — CEFTRIAXONE 1 G: 1 INJECTION, POWDER, FOR SOLUTION INTRAMUSCULAR; INTRAVENOUS at 07:09

## 2025-09-02 RX ADMIN — DEXAMETHASONE SODIUM PHOSPHATE 4 MG: 4 INJECTION, SOLUTION INTRA-ARTICULAR; INTRALESIONAL; INTRAMUSCULAR; INTRAVENOUS; SOFT TISSUE at 07:09

## (undated) DEVICE — NEEDLE ECLIPSE 25GX1 1/2 IN

## (undated) DEVICE — GLOVE SURGICAL PROTEXIS PI SIZE 6.5

## (undated) DEVICE — COVER PROBE 6X96 IN STERILE CS/20

## (undated) DEVICE — APPLICATOR CHLORAPREP HI-LITE TINTED ORANGE 26ML

## (undated) DEVICE — DISK PROTECTIVE BIOPATCH AMD

## (undated) DEVICE — CDS ENT

## (undated) DEVICE — SUTURE SILK 2-0 FS 18 BLACK

## (undated) DEVICE — SYRINGE 10-12CC LURE -LOK TIP

## (undated) DEVICE — SYRINGE 30CC LL

## (undated) DEVICE — GLOVE SURGICAL PROTEXIS PI SIZE 8.0

## (undated) DEVICE — GLOVE SURGICAL PROTEXIS PI BLUE SIZE 6.5

## (undated) DEVICE — SUTURE VICRYL 3-0 SH UNDYED 27IN

## (undated) DEVICE — DRAPE FLUORO C ARM 36X30IN

## (undated) DEVICE — KIT CATH HEMODIALYSIS GLIDEPATH 23CM STR.

## (undated) DEVICE — GOWN SURGICAL SMARTGOWN LEVEL 4 / EXTRA LARGE STERILE

## (undated) DEVICE — SUPPORT HEADREST DO-NUT

## (undated) DEVICE — BAG-A-JET FLUID DISPENSER SYSTEM

## (undated) DEVICE — GLOVE SURGICAL PROTEXIS PI BLUE SIZE 7.0

## (undated) DEVICE — STRIP CLOSURE SKIN 1/2 X 4 IN